# Patient Record
Sex: FEMALE | Race: WHITE | NOT HISPANIC OR LATINO | Employment: FULL TIME | URBAN - METROPOLITAN AREA
[De-identification: names, ages, dates, MRNs, and addresses within clinical notes are randomized per-mention and may not be internally consistent; named-entity substitution may affect disease eponyms.]

---

## 2017-05-30 ENCOUNTER — THERAPY VISIT (OUTPATIENT)
Dept: OCCUPATIONAL THERAPY | Facility: CLINIC | Age: 62
End: 2017-05-30
Payer: COMMERCIAL

## 2017-05-30 DIAGNOSIS — R29.898 HAND WEAKNESS: Primary | ICD-10-CM

## 2017-05-30 PROCEDURE — 97535 SELF CARE MNGMENT TRAINING: CPT | Mod: GO | Performed by: OCCUPATIONAL THERAPIST

## 2017-05-30 PROCEDURE — 97110 THERAPEUTIC EXERCISES: CPT | Mod: GO | Performed by: OCCUPATIONAL THERAPIST

## 2017-05-30 PROCEDURE — 97165 OT EVAL LOW COMPLEX 30 MIN: CPT | Mod: GO | Performed by: OCCUPATIONAL THERAPIST

## 2017-05-30 NOTE — MR AVS SNAPSHOT
After Visit Summary   5/30/2017    Kristin Brooke    MRN: [id number]           Patient Information     Date Of Birth          1955        Visit Information        Provider Department      5/30/2017 9:30 AM Gaby Smith [facility]        Today's Diagnoses     Hand weakness    -  1       Follow-ups after your visit        Who to contact     If you have questions or need follow up information about today's clinic visit or your schedule please contact [facility] directly at [phone].  Normal or non-critical lab and imaging results will be communicated to you by Dnevnikhart, letter or phone within 4 business days after the clinic has received the results. If you do not hear from us within 7 days, please contact the clinic through Dnevnikhart or phone. If you have a critical or abnormal lab result, we will notify you by phone as soon as possible.  Submit refill requests through [employer] or call your pharmacy and they will forward the refill request to us. Please allow 3 business days for your refill to be completed.          Additional Information About Your Visit        MyChart Information     [employer] gives you secure access to your electronic health record. If you see a primary care provider, you can also send messages to your care team and make appointments. If you have questions, please call your primary care clinic.  If you do not have a primary care provider, please call [phone] and they will assist you.        Care EveryWhere ID     This is your Care EveryWhere ID. This could be used by other organizations to access your [place] medical records  [id number]         Blood Pressure from Last 3 Encounters:   12/30/11 126/78   11/15/11 133/77   06/13/11 132/82    Weight from Last 3 Encounters:   12/30/11 54.4 kg (120 lb)   11/15/11 55.3 kg (122 lb)   06/13/11 55.9 kg (123 lb 3.2 oz)              We Performed the Following     HC OT EVAL, LOW COMPLEXITY     AUBREY INITIAL EVAL  REPORT     SELF CARE MNGMENT TRAINING     THERAPEUTIC EXERCISES        Primary Care Provider Office Phone # Fax #    Macario Stanford [phone]       [place] [address]  [facility] [address]        Thank you!     Thank you for choosing [facility]  for your care. Our goal is always to provide you with excellent care. Hearing back from our patients is one way we can continue to improve our services. Please take a few minutes to complete the written survey that you may receive in the mail after your visit with us. Thank you!             Your Updated Medication List - Protect others around you: Learn how to safely use, store and throw away your medicines at www.disposemymeds.org.          This list is accurate as of: 5/30/17 11:59 AM.  Always use your most recent med list.                   Brand Name Dispense Instructions for use    ADVIL 200 MG capsule   Generic drug:  ibuprofen      1 CAPSULE EVERY 4 TO 6 HOURS AS NEEDED       ALPRAZolam 0.25 MG tablet    XANAX    60 tablet    Take 1 tablet by mouth 2 times daily as needed for anxiety.       atorvastatin 10 MG tablet    LIPITOR    90 tablet    Take 1 tablet by mouth daily.       * COMPOUND - PHARMACY TO MIX COMPOUNDED MEDICATION    CMPD RX    6 suppository    1 suppository once a week. 1 % HCT Urethral Suppository       lisinopril 10 MG tablet    PRINIVIL/ZESTRIL    94 tablet    Take 1 tablet by mouth daily.       Multiple vitamin Tabs      1 TABLET DAILY       sertraline 50 MG tablet    ZOLOFT    45 tablet    Take 1.5 tablets by mouth daily.       * UNKNOWN MED DOSAGE      calcium carbonate chews 1200mg once daily       zolpidem 5 MG tablet    AMBIEN    30 tablet    Take 1 tablet by mouth nightly as needed for sleep. at bedtime.       * Notice:  This list has 2 medication(s) that are the same as other medications prescribed for you. Read the directions carefully, and ask your doctor or other care provider to review them with  you.

## 2017-05-30 NOTE — PROGRESS NOTES
Hand Therapy Initial Evaluation    Current Date:  5/30/2017    Diagnosis:  Wrist/hand weakness  DOI: April 2017      Subjective:  Kristin Brooke is a 61 year old right hand dominant female.    Patient reports symptoms of stiffness/loss of motion, weakness/loss of strength and tingling  of the right hand which occurred due to overuse. Since onset symptoms are Unchanged  Special tests:  none.  Previous treatment: Hand Therapy 5 years ago.    General health as reported by patient is excellent.  Pertinent medical history includes:high blood pressure  Medical allergies:Levequin.  Surgical history: none.  Medication history: high blood pressure.    Current occupation is    Currently working in normal job without restrictions  Job Tasks: prolonged standing, repetitive tasks  Barriers include:none  Prior functional level:  no limitations    Additional Occupational Profile Information (patterns of daily living, interests, values and needs): [employer], yoga, bike ride, cook, walk dog    Upper Extremity Functional Index Score:  SCORE:   Column Totals: /80: 73   (A lower score indicates greater disability.)      Objective:  Pain Level Report  VAS(0-10) 5/30/2017   At Rest: 0   With Use: 0     Functional Exam:   Pain Report:  - none    + mild    ++ moderate    +++ severe      Posture:  []     Normal   [x]    Forward Neck Posture  []    Rounded Forward Shoulders  []    Shoulder Height Difference  Comments:      Cervical Screen Active  5/30/17     ROM good     Flexion -     Extension -     Lateral Flexion Right -     Lateral Flexion Left -     Rotation Right -     Rotation Left -     Compression -     Traction        Special Tests:  Symptom Report:    5/30/2017   [place] +   Costoclavicular +   Elbow flexion +                             Palpation:  Pain Report:  - none    + mild    ++ moderate    +++ severe   Tenderness 5/30/2017   Ulnar elbow R>L   Ulnar forearm (several areas) +   [place] -   Pec  +    Subscap/teres/lat ++   tricep ++     Ulnar Nerve MMT: (Scale 0/5)   5/30/17      FCU 5/5      FDP II, IV 4/5      ADM 4/5      ODM 4/5      FDM 5/5      Palmar Interossei 4-/5      Dorsal Interossei 4/5      Lumbricals III, IV 5/5      Adductor Pollicis 4-/5      FPB 5/5            Strength:   (Measured in pounds)  Pain Report:  - none    + mild    ++ moderate    +++ severe     5/30/2017   Trials Left Right   1  2  3     Average: 55# 38#     Lat Pinch  5/30/2017   Trials Left Right   1  2  3     Average: 16# 10# + Fromert's sign     3 Pt Pinch  5/30/2017   Trials Left Right   1  2  3     Average: 14# 6#     Edema:  NONE    Sensation:  WNL throughout all nerve distributions; per patient report distribution    Assessment:  Patient presents with symptoms consistent with diagnosis of UE nerve impingment and hand weakness,  with conservative intervention.     Patient's limitations or Problem List includes:  Weakness, Decreased , Decreased pinch and Tightness in musculature of the right hand which interferes with the patient's ability to perform Self Care Tasks (dressing, eating, bathing, hygiene/toileting), Work Tasks, Recreational Activities, Household Chores and Driving  as compared to previous level of function.    Rehab Potential:  Good - Return to full activity, some limitations    Patient will benefit from skilled Occupational Therapy to increase flexibility, overall strength,  strength and pinch strength to return to previous activity level and resume normal daily tasks and to reach their rehab potential.    Barriers to Learning:  No barrier    Communication Issues:  Patient appears to be able to clearly communicate and understand verbal and written communication and follow directions correctly.    Assessment of Occupational Performance:  5 or more Performance Deficits  Identified Performance Deficits: bathing/showering, toileting, dressing, feeding, hygiene and grooming, health management and  maintenance, home establishment and management, meal preparation and cleanup, shopping, work and leisure activities      Clinical Decision Making (Complexity): Low complexity    Treatment Explanation:  The following has been discussed with the patient:  RX ordered/plan of care  Anticipated outcomes  Possible risks and side effects    Plan:  Frequency:  1 X week, once daily  Duration:  for 6-12 visits    Treatment Plan:    Modalities:  Laser Light  Therapeutic Exercise:  AROM and Stabilization  Neuromuscular re-education:  Nerve Gliding  Manual Techniques:  Myofascial release  Orthotic Fabrication:  prn  Discharge Plan:  Achieve all LTG.  Independent in home treatment program.  Reach maximal therapeutic benefit.    Home Exercise Program:  Foam roller pecs;lats;teres  Tennis ball to infraspinatus  Can massage to tricep  Scapular squeezes    Next Visit:  Nerve gliding  Cont. Foam rolling  strengthening

## 2017-06-17 ENCOUNTER — HEALTH MAINTENANCE LETTER (OUTPATIENT)
Age: 62
End: 2017-06-17

## 2017-06-23 PROBLEM — R29.898 HAND WEAKNESS: Status: RESOLVED | Noted: 2017-05-30 | Resolved: 2017-06-23

## 2017-06-23 NOTE — PROGRESS NOTES
Discharge Note -Hand Therapy    Initial Evaluation Date:  5/30/17  Current Date: 6/23/2017  Number of Visits: 1    S:    Pt did not follow up for scheduled visits.    O:  Objective information is not available as pt has not returned for therapy.  Last visit or last objective information will serve as final entry.      A: Pt did not return for further treatment.    Status of goals is unknown due to lack of followup of patient.      P:  Discharge from Hand Therapy.

## 2017-07-13 ENCOUNTER — THERAPY VISIT (OUTPATIENT)
Dept: OCCUPATIONAL THERAPY | Facility: CLINIC | Age: 62
End: 2017-07-13
Payer: COMMERCIAL

## 2017-07-13 DIAGNOSIS — G56.20 LESION OF ULNAR NERVE: Primary | ICD-10-CM

## 2017-07-13 DIAGNOSIS — M79.609 PAIN IN LIMB: ICD-10-CM

## 2017-07-13 DIAGNOSIS — R29.898 HAND WEAKNESS: ICD-10-CM

## 2017-07-13 DIAGNOSIS — M65.311 TRIGGER THUMB OF RIGHT HAND: ICD-10-CM

## 2017-07-13 PROCEDURE — 97110 THERAPEUTIC EXERCISES: CPT | Mod: GO | Performed by: OCCUPATIONAL THERAPIST

## 2017-07-13 PROCEDURE — 97140 MANUAL THERAPY 1/> REGIONS: CPT | Mod: GO | Performed by: OCCUPATIONAL THERAPIST

## 2017-07-13 NOTE — PROGRESS NOTES
Progress Note - Hand Therapy    Current Date:  7/13/2017    Diagnosis:  Wrist/hand weakness  DOI: April 2017    Number of visits to date:  1    Reporting period is 5/30/2017 to 7/13/2017.    Subjectve:   Subjective changes as noted by patient:  I haven't done my exercises.  I don't think I am doing them correctly.     Functional changes noted by patient:  No Change to Self Care Tasks, Work Tasks, Sleep Patterns, Recreational Activities, Household Chores and Driving   Patient has noted adverse reaction to:  None        Objective:    Pain Level Report  VAS(0-10) 5/30/2017 7/13/2017   At Rest: 0 0   With Use: 0 0     Functional Exam:   Pain Report:  - none    + mild    ++ moderate    +++ severe      Posture:  []     Normal   [x]    Forward Neck Posture  []    Rounded Forward Shoulders  []    Shoulder Height Difference  Comments:      Cervical Screen Active  5/30/17     ROM good     Flexion -     Extension -     Lateral Flexion Right -     Lateral Flexion Left -     Rotation Right -     Rotation Left -     Compression -     Traction        Special Tests:  Symptom Report:    5/30/2017 7/13/2017   [place] +    Costoclavicular +    Elbow flexion +                                    Palpation:  Pain Report:  - none    + mild    ++ moderate    +++ severe   Tenderness 5/30/2017 7/13/2017   Ulnar elbow R>L    Ulnar forearm (several areas) + +   [place] -    Pec  + +   Subscap/teres/lat ++ ++   tricep ++ ++     Ulnar Nerve MMT: (Scale 0/5)   5/30/17 7/13/17     FCU 5/5 5/5     FDP II, IV 4/5 4/5     ADM 4/5 4/5     ODM 4/5 4/5     FDM 5/5 5/5     Palmar Interossei 4-/5 4-/5     Dorsal Interossei 4/5 4/5     Lumbricals III, IV 5/5 5/5     Adductor Pollicis 4-/5 4-/5     FPB 5/5 5/5           Strength:   (Measured in pounds)  Pain Report:  - none    + mild    ++ moderate    +++ severe     5/30/2017 7/13/2017   Trials Left Right Right   1  2  3      Average: 55# 38# 38#     Lat Pinch  5/30/2017 7/13/2017   Trials Left Right  Right   1  2  3      Average: 16# 10# + Fromert's sign 10# + Fromert's sign     3 Pt Pinch  5/30/2017 7/13/2017   Trials Left Right Right   1  2  3      Average: 14# 6# 6#     Edema:  NONE    Sensation:  WNL throughout all nerve distributions; per patient report distribution    Assessment:  Response to therapy has been lack of progress in:  Strength:  Due to not doing exercises.    Overall Assessment:  No change of symptoms has been noted.  STG/LTG:  STGoals have been reviewed and no progress has been made;  see goal sheet for details and changes.    Plan:  Frequency/Duration:  Recommend continuing to see patient  1 X week, once daily  for 8-12 visits and then wean to   strengthening to 1x/month.  Appropriateness of Rx I have re-evaluated this patient and find that the nature, scope, duration and intensity of the therapy is appropriate for the medical condition of the patient.    Recommendations for Continued Therapy      Home Exercise Program:  Foam roller pecs;lats;teres  Tennis ball to infraspinatus  Can massage to tricep  Scapular squeezes    Next Visit:  Nerve gliding  Cont. Foam rolling  strengthening

## 2017-07-13 NOTE — MR AVS SNAPSHOT
After Visit Summary   7/13/2017    Kristin Brooke    MRN: [id number]           Patient Information     Date Of Birth          1955        Visit Information        Provider Department      7/13/2017 9:30 AM Gaby Smith [facility]        Today's Diagnoses     Lesion of ulnar nerve    -  1    Pain in limb        Hand weakness        Trigger thumb of right hand           Follow-ups after your visit        Your next 10 appointments already scheduled     Jul 18, 2017  9:30 AM CDT   AUBREY Hand with Gaby Smith   [facility] ([facility])    [address] [id number] [phone]            Jul 27, 2017  9:30 AM CDT   AUBREY Hand with Gaby Sarah   [facility] ([facility])    [address] [facility] [address]  [facility] [id number] [phone]              Who to contact     If you have questions or need follow up information about today's clinic visit or your schedule please contact [facility] directly at [phone].  Normal or non-critical lab and imaging results will be communicated to you by [employer] Centerhart, letter or phone within 4 business days after the clinic has received the results. If you do not hear from us within 7 days, please contact the clinic through [employer] or phone. If you have a critical or abnormal lab result, we will notify you by phone as soon as possible.  Submit refill requests through [employer] or call your pharmacy and they will forward the refill request to us. Please allow 3 business days for your refill to be completed.          Additional Information About Your Visit        National Transcript Centerhart Information     [employer] gives you secure access to your electronic health record. If you see a primary care provider, you can also send messages to your care team and make appointments. If you have questions, please call your primary care clinic.  If you do not have a primary care provider, please call [phone] and  they will assist you.        Care EveryWhere ID     This is your Care EveryWhere ID. This could be used by other organizations to access your [place] medical records  [id number]         Blood Pressure from Last 3 Encounters:   12/30/11 126/78   11/15/11 133/77   06/13/11 132/82    Weight from Last 3 Encounters:   12/30/11 54.4 kg (120 lb)   11/15/11 55.3 kg (122 lb)   06/13/11 55.9 kg (123 lb 3.2 oz)              We Performed the Following     AUBREY PROGRESS NOTES REPORT     MANUAL THER TECH,1+REGIONS,EA 15 MIN     THERAPEUTIC EXERCISES        Primary Care Provider Office Phone # Fax #    Macario Stanford [phone]       [facility] [address]  [facility] [address]        Equal Access to Services     VALE NUNES : Hadii aad ku hadasho Soomaali, waaxda luqadaha, qaybta kaalmada adeegyada, waxay yakov hayrosio jacques . So [facility] [phone].    ATENCIÓN: Si habla español, tiene a matthews disposición servicios gratuitos de asistencia lingüística. Sanjuana al [phone].    We comply with applicable federal civil rights laws and [place] laws. We do not discriminate on the basis of race, color, national origin, age, disability sex, sexual orientation or gender identity.            Thank you!     Thank you for choosing [facility]  for your care. Our goal is always to provide you with excellent care. Hearing back from our patients is one way we can continue to improve our services. Please take a few minutes to complete the written survey that you may receive in the mail after your visit with us. Thank you!             Your Updated Medication List - Protect others around you: Learn how to safely use, store and throw away your medicines at www.disposemymeds.org.          This list is accurate as of: 7/13/17 10:30 AM.  Always use your most recent med list.                   Brand Name Dispense Instructions for use Diagnosis    ADVIL 200 MG capsule   Generic drug:  ibuprofen       1 CAPSULE EVERY 4 TO 6 HOURS AS NEEDED        ALPRAZolam 0.25 MG tablet    XANAX    60 tablet    Take 1 tablet by mouth 2 times daily as needed for anxiety.    Generalised anxiety disorder       atorvastatin 10 MG tablet    LIPITOR    90 tablet    Take 1 tablet by mouth daily.    UTI (urinary tract infection)       * COMPOUND - PHARMACY TO MIX COMPOUNDED MEDICATION    CMPD RX    6 suppository    1 suppository once a week. 1 % HCT Urethral Suppository    UTI (urinary tract infection), Dysuria       lisinopril 10 MG tablet    PRINIVIL/ZESTRIL    94 tablet    Take 1 tablet by mouth daily.    Essential hypertension, benign       Multiple vitamin Tabs      1 TABLET DAILY        sertraline 50 MG tablet    ZOLOFT    45 tablet    Take 1.5 tablets by mouth daily.    Acute stress reaction       * UNKNOWN MED DOSAGE      calcium carbonate chews 1200mg once daily        zolpidem 5 MG tablet    AMBIEN    30 tablet    Take 1 tablet by mouth nightly as needed for sleep. at bedtime.    Insomnia       * Notice:  This list has 2 medication(s) that are the same as other medications prescribed for you. Read the directions carefully, and ask your doctor or other care provider to review them with you.

## 2017-07-18 ENCOUNTER — THERAPY VISIT (OUTPATIENT)
Dept: OCCUPATIONAL THERAPY | Facility: CLINIC | Age: 62
End: 2017-07-18
Payer: COMMERCIAL

## 2017-07-18 DIAGNOSIS — M79.609 PAIN IN LIMB: ICD-10-CM

## 2017-07-18 DIAGNOSIS — G56.20 LESION OF ULNAR NERVE: Primary | ICD-10-CM

## 2017-07-18 DIAGNOSIS — M65.311 TRIGGER THUMB OF RIGHT HAND: ICD-10-CM

## 2017-07-18 DIAGNOSIS — R29.898 HAND WEAKNESS: ICD-10-CM

## 2017-07-18 PROCEDURE — 97140 MANUAL THERAPY 1/> REGIONS: CPT | Mod: GO | Performed by: OCCUPATIONAL THERAPIST

## 2017-07-18 PROCEDURE — 97110 THERAPEUTIC EXERCISES: CPT | Mod: GO | Performed by: OCCUPATIONAL THERAPIST

## 2017-07-18 PROCEDURE — 97026 INFRARED THERAPY: CPT | Mod: GO | Performed by: OCCUPATIONAL THERAPIST

## 2017-07-18 NOTE — MR AVS SNAPSHOT
After Visit Summary   7/18/2017    Kristin Brooke    MRN: [id number]           Patient Information     Date Of Birth          1955        Visit Information        Provider Department      7/18/2017 9:30 AM Gaby Smith [facility]        Today's Diagnoses     Lesion of ulnar nerve    -  1    Pain in limb        Trigger thumb of right hand        Hand weakness           Follow-ups after your visit        Your next 10 appointments already scheduled     Jul 27, 2017  9:30 AM CDT   [facility] Hand with Gaby Smith   [facility]    [address] [place] [id number] [phone]              Who to contact     If you have questions or need follow up information about today's clinic visit or your schedule please contact [facility] directly at [phone].  Normal or non-critical lab and imaging results will be communicated to you by PureCarshart, letter or phone within 4 business days after the clinic has received the results. If you do not hear from us within 7 days, please contact the clinic through PureCarshart or phone. If you have a critical or abnormal lab result, we will notify you by phone as soon as possible.  Submit refill requests through [employer] or call your pharmacy and they will forward the refill request to us. Please allow 3 business days for your refill to be completed.          Additional Information About Your Visit        MyChart Information     [employer] gives you secure access to your electronic health record. If you see a primary care provider, you can also send messages to your care team and make appointments. If you have questions, please call your primary care clinic.  If you do not have a primary care provider, please call [phone] and they will assist you.        Care EveryWhere ID     This is your Care EveryWhere ID. This could be used by other organizations to access your [place] medical records  [id number]          Blood Pressure from Last 3 Encounters:   12/30/11 126/78   11/15/11 133/77   06/13/11 132/82    Weight from Last 3 Encounters:   12/30/11 54.4 kg (120 lb)   11/15/11 55.3 kg (122 lb)   06/13/11 55.9 kg (123 lb 3.2 oz)              We Performed the Following     INFRARED THERAPY     MANUAL THER TECH,1+REGIONS,EA 15 MIN     THERAPEUTIC EXERCISES        Primary Care Provider Office Phone # Fax #    Macario Stanford [phone]       [facility] [address] [facility] [address]        Equal Access to Services     [facility] : Hadii aad ku hadasho Soperryali, waaxda luqadaha, qaybta kaalmada adeegyada, waxtanya jacques . So [facility] [phone].    ATENCIÓN: Si habla español, tiene a matthews disposición servicios gratuitos de asistencia lingüística. [facility] [phone].    We comply with applicable federal civil rights laws and [place] laws. We do not discriminate on the basis of race, color, national origin, age, disability sex, sexual orientation or gender identity.            Thank you!     Thank you for choosing [facility]  for your care. Our goal is always to provide you with excellent care. Hearing back from our patients is one way we can continue to improve our services. Please take a few minutes to complete the written survey that you may receive in the mail after your visit with us. Thank you!             Your Updated Medication List - Protect others around you: Learn how to safely use, store and throw away your medicines at www.disposemymeds.org.          This list is accurate as of: 7/18/17 11:41 AM.  Always use your most recent med list.                   Brand Name Dispense Instructions for use Diagnosis    ADVIL 200 MG capsule   Generic drug:  ibuprofen      1 CAPSULE EVERY 4 TO 6 HOURS AS NEEDED        ALPRAZolam 0.25 MG tablet    XANAX    60 tablet    Take 1 tablet by mouth 2 times daily as needed for anxiety.    Generalised anxiety disorder        atorvastatin 10 MG tablet    LIPITOR    90 tablet    Take 1 tablet by mouth daily.    UTI (urinary tract infection)       * COMPOUND - PHARMACY TO MIX COMPOUNDED MEDICATION    CMPD RX    6 suppository    1 suppository once a week. 1 % HCT Urethral Suppository    UTI (urinary tract infection), Dysuria       lisinopril 10 MG tablet    PRINIVIL/ZESTRIL    94 tablet    Take 1 tablet by mouth daily.    Essential hypertension, benign       Multiple vitamin Tabs      1 TABLET DAILY        sertraline 50 MG tablet    ZOLOFT    45 tablet    Take 1.5 tablets by mouth daily.    Acute stress reaction       * UNKNOWN MED DOSAGE      calcium carbonate chews 1200mg once daily        zolpidem 5 MG tablet    AMBIEN    30 tablet    Take 1 tablet by mouth nightly as needed for sleep. at bedtime.    Insomnia       * Notice:  This list has 2 medication(s) that are the same as other medications prescribed for you. Read the directions carefully, and ask your doctor or other care provider to review them with you.

## 2017-07-18 NOTE — PROGRESS NOTES
SOAP note objective information for 7/18/2017.    Please refer to the daily flowsheet for treatment today, total treatment time and time spent performing 1:1 timed codes.       Pain Level Report  VAS(0-10) 5/30/2017 7/13/2017 7/18/2017   At Rest: 0 0 0   With Use: 0 0 0     Functional Exam:   Pain Report:  - none    + mild    ++ moderate    +++ severe      Posture:  []     Normal   [x]    Forward Neck Posture  []    Rounded Forward Shoulders  []    Shoulder Height Difference  Comments:      Cervical Screen Active  5/30/17     ROM good     Flexion -     Extension -     Lateral Flexion Right -     Lateral Flexion Left -     Rotation Right -     Rotation Left -     Compression -     Traction        Special Tests:  Symptom Report:    5/30/2017    [place] +    Costoclavicular +    Elbow flexion +                                    Palpation:  Pain Report:  - none    + mild    ++ moderate    +++ severe   Tenderness 5/30/2017 7/13/2017   Ulnar elbow R>L    Ulnar forearm (several areas) + +   [place] -    Pec  + +   Subscap/teres/lat ++ ++   tricep ++ ++     Ulnar Nerve MMT: (Scale 0/5)   5/30/17 7/13/17     FCU 5/5 5/5     FDP II, IV 4/5 4/5     ADM 4/5 4/5     ODM 4/5 4/5     FDM 5/5 5/5     Palmar Interossei 4-/5 4-/5     Dorsal Interossei 4/5 4/5     Lumbricals III, IV 5/5 5/5     Adductor Pollicis 4-/5 4-/5     FPB 5/5 5/5           Strength:   (Measured in pounds)  Pain Report:  - none    + mild    ++ moderate    +++ severe     5/30/2017 7/13/2017   Trials Left Right Right   1  2  3      Average: 55# 38# 38#     Lat Pinch  5/30/2017 7/13/2017   Trials Left Right Right   1  2  3      Average: 16# 10# + Fromert's sign 10# + Fromert's sign     3 Pt Pinch  5/30/2017 7/13/2017   Trials Left Right Right   1  2  3      Average: 14# 6# 6#     Edema:  NONE    Sensation:  WNL throughout all nerve distributions; per patient report distribution    Home Exercise Program:  Foam roller pecs;lats;teres  Tennis ball to  infraspinatus  Can massage to tricep  Scapular squeezes  Lat stretch  pec stretch for median and ulnar    Next Visit:  Nerve gliding  Cont. Foam rolling  strengthening

## 2017-07-27 ENCOUNTER — THERAPY VISIT (OUTPATIENT)
Dept: OCCUPATIONAL THERAPY | Facility: CLINIC | Age: 62
End: 2017-07-27
Payer: COMMERCIAL

## 2017-07-27 DIAGNOSIS — R29.898 HAND WEAKNESS: Primary | ICD-10-CM

## 2017-07-27 PROCEDURE — 97110 THERAPEUTIC EXERCISES: CPT | Mod: GO | Performed by: OCCUPATIONAL THERAPIST

## 2017-07-27 PROCEDURE — 97140 MANUAL THERAPY 1/> REGIONS: CPT | Mod: GO | Performed by: OCCUPATIONAL THERAPIST

## 2017-07-27 PROCEDURE — 97026 INFRARED THERAPY: CPT | Mod: GO | Performed by: OCCUPATIONAL THERAPIST

## 2017-07-27 NOTE — PROGRESS NOTES
SOAP note objective information for 7/27/2017    Please refer to the daily flowsheet for treatment today, total treatment time and time spent performing 1:1 timed codes.       Pain Level Report  VAS(0-10) 5/30/2017 7/13/2017 7/18/2017   At Rest: 0 0 0   With Use: 0 0 0     Functional Exam:   Pain Report:  - none    + mild    ++ moderate    +++ severe      Posture:  []     Normal   [x]    Forward Neck Posture  []    Rounded Forward Shoulders  []    Shoulder Height Difference  Comments:      Cervical Screen Active  5/30/17     ROM good     Flexion -     Extension -     Lateral Flexion Right -     Lateral Flexion Left -     Rotation Right -     Rotation Left -     Compression -     Traction        Special Tests:  Symptom Report:    5/30/2017    [place] +    Costoclavicular +    Elbow flexion +                                    Palpation:  Pain Report:  - none    + mild    ++ moderate    +++ severe   Tenderness 5/30/17 7/13/17 7/27/17   Ulnar elbow R>L     Ulnar forearm (several areas) + +    [place] -     Pec  + +    Subscap/teres/lat ++ ++    tricep ++ ++      Ulnar Nerve MMT: (Scale 0/5)   5/30/17 7/13/17     FCU 5/5 5/5     FDP II, IV 4/5 4/5     ADM 4/5 4/5     ODM 4/5 4/5     FDM 5/5 5/5     Palmar Interossei 4-/5 4-/5     Dorsal Interossei 4/5 4/5     Lumbricals III, IV 5/5 5/5     Adductor Pollicis 4-/5 4-/5     FPB 5/5 5/5           Strength:   (Measured in pounds)  Pain Report:  - none    + mild    ++ moderate    +++ severe     5/30/2017 7/13/2017   Trials Left Right Right   1  2  3      Average: 55# 38# 38#     Lat Pinch  5/30/2017 7/13/2017   Trials Left Right Right   1  2  3      Average: 16# 10# + Fromert's sign 10# + Fromert's sign     3 Pt Pinch  5/30/2017 7/13/2017   Trials Left Right Right   1  2  3      Average: 14# 6# 6#     Edema:  NONE    Sensation:  WNL throughout all nerve distributions; per patient report distribution    Home Exercise Program:  Foam roller pecs;lats;teres  Tennis ball to  infraspinatus  Can massage to tricep  Scapular squeezes  Lat stretch  pec stretch for median and ulnar    Next Visit:  Nerve gliding  Cont. Foam rolling  strengthening

## 2017-07-27 NOTE — MR AVS SNAPSHOT
After Visit Summary   7/27/2017    Kristin Brooke    MRN: [id number]           Patient Information     Date Of Birth          1955        Visit Information        Provider Department      7/27/2017 9:30 AM Gaby Smith [facility]        Today's Diagnoses     Hand weakness    -  1       Follow-ups after your visit        Who to contact     If you have questions or need follow up information about today's clinic visit or your schedule please contact [facility] directly at [phone].  Normal or non-critical lab and imaging results will be communicated to you by Cumuluxhart, letter or phone within 4 business days after the clinic has received the results. If you do not hear from us within 7 days, please contact the clinic through Cumuluxhart or phone. If you have a critical or abnormal lab result, we will notify you by phone as soon as possible.  Submit refill requests through CoinJar or call your pharmacy and they will forward the refill request to us. Please allow 3 business days for your refill to be completed.          Additional Information About Your Visit        MyChart Information     CoinJar gives you secure access to your electronic health record. If you see a primary care provider, you can also send messages to your care team and make appointments. If you have questions, please call your primary care clinic.  If you do not have a primary care provider, please call [phone] and they will assist you.        Care EveryWhere ID     This is your Care EveryWhere ID. This could be used by other organizations to access your [place] medical records  [id number]         Blood Pressure from Last 3 Encounters:   12/30/11 126/78   11/15/11 133/77   06/13/11 132/82    Weight from Last 3 Encounters:   12/30/11 54.4 kg (120 lb)   11/15/11 55.3 kg (122 lb)   06/13/11 55.9 kg (123 lb 3.2 oz)              We Performed the Following     INFRARED THERAPY     MANUAL THER TECH,1+REGIONS,EA 15  MIN     THERAPEUTIC EXERCISES        Primary Care Provider Office Phone # Fax #    Macario Stanford [phone]       [place] [address]  [facility] [address]        Equal Access to Services     VLAE NUNES : Haduche yousif ku opalo Soperryali, waaxda luqadaha, qaybta kaalmada adeegyada, waxtanya vinesn iveth lee laSergiorosio frausto. So [facility] [phone].    ATENCIÓN: Si habla español, tiene a matthews disposición servicios gratuitos de asistencia lingüística. Llame al [phone].    We comply with applicable federal civil rights laws and [place] laws. We do not discriminate on the basis of race, color, national origin, age, disability sex, sexual orientation or gender identity.            Thank you!     Thank you for choosing [facility]  for your care. Our goal is always to provide you with excellent care. Hearing back from our patients is one way we can continue to improve our services. Please take a few minutes to complete the written survey that you may receive in the mail after your visit with us. Thank you!             Your Updated Medication List - Protect others around you: Learn how to safely use, store and throw away your medicines at www.disposemymeds.org.          This list is accurate as of: 7/27/17 11:28 AM.  Always use your most recent med list.                   Brand Name Dispense Instructions for use Diagnosis    ADVIL 200 MG capsule   Generic drug:  ibuprofen      1 CAPSULE EVERY 4 TO 6 HOURS AS NEEDED        ALPRAZolam 0.25 MG tablet    XANAX    60 tablet    Take 1 tablet by mouth 2 times daily as needed for anxiety.    Generalised anxiety disorder       atorvastatin 10 MG tablet    LIPITOR    90 tablet    Take 1 tablet by mouth daily.    UTI (urinary tract infection)       * COMPOUNDED NON-CONTROLLED SUBSTANCE - PHARMACY TO MIX COMPOUNDED MEDICATION    CMPD RX    6 suppository    1 suppository once a week. 1 % HCT Urethral Suppository    UTI (urinary tract  infection), Dysuria       lisinopril 10 MG tablet    PRINIVIL/ZESTRIL    94 tablet    Take 1 tablet by mouth daily.    Essential hypertension, benign       Multiple vitamin Tabs      1 TABLET DAILY        sertraline 50 MG tablet    ZOLOFT    45 tablet    Take 1.5 tablets by mouth daily.    Acute stress reaction       * UNKNOWN MED DOSAGE      calcium carbonate chews 1200mg once daily        zolpidem 5 MG tablet    AMBIEN    30 tablet    Take 1 tablet by mouth nightly as needed for sleep. at bedtime.    Insomnia       * Notice:  This list has 2 medication(s) that are the same as other medications prescribed for you. Read the directions carefully, and ask your doctor or other care provider to review them with you.

## 2017-08-04 ENCOUNTER — THERAPY VISIT (OUTPATIENT)
Dept: OCCUPATIONAL THERAPY | Facility: CLINIC | Age: 62
End: 2017-08-04
Payer: COMMERCIAL

## 2017-08-04 DIAGNOSIS — R29.898 HAND WEAKNESS: ICD-10-CM

## 2017-08-04 PROCEDURE — 97112 NEUROMUSCULAR REEDUCATION: CPT | Mod: GO | Performed by: OCCUPATIONAL THERAPIST

## 2017-08-04 PROCEDURE — 97140 MANUAL THERAPY 1/> REGIONS: CPT | Mod: GO | Performed by: OCCUPATIONAL THERAPIST

## 2017-08-04 NOTE — MR AVS SNAPSHOT
After Visit Summary   8/4/2017    Kristin Brooke    MRN: [id number]           Patient Information     Date Of Birth          1955        Visit Information        Provider Department      8/4/2017 9:00 AM Gaby Smith [facility]        Today's Diagnoses     Hand weakness           Follow-ups after your visit        Your next 10 appointments already scheduled     Sep 01, 2017  9:30 AM CDT   AUBREY Hand with Gaby Sarah   [facility] ([facility])    [address] [place] [address] [phone]              Who to contact     If you have questions or need follow up information about today's clinic visit or your schedule please contact [facility] directly at [phone].  Normal or non-critical lab and imaging results will be communicated to you by Riot Gameshart, letter or phone within 4 business days after the clinic has received the results. If you do not hear from us within 7 days, please contact the clinic through Riot Gameshart or phone. If you have a critical or abnormal lab result, we will notify you by phone as soon as possible.  Submit refill requests through Realius or call your pharmacy and they will forward the refill request to us. Please allow 3 business days for your refill to be completed.          Additional Information About Your Visit        MyChart Information     Realius gives you secure access to your electronic health record. If you see a primary care provider, you can also send messages to your care team and make appointments. If you have questions, please call your primary care clinic.  If you do not have a primary care provider, please call [phone] and they will assist you.        Care EveryWhere ID     This is your Care EveryWhere ID. This could be used by other organizations to access your [place] medical records  [id number]         Blood Pressure from Last 3 Encounters:   12/30/11 126/78   11/15/11 133/77   06/13/11  132/82    Weight from Last 3 Encounters:   12/30/11 54.4 kg (120 lb)   11/15/11 55.3 kg (122 lb)   06/13/11 55.9 kg (123 lb 3.2 oz)              We Performed the Following     MANUAL THER TECH,1+REGIONS,EA 15 MIN     NEUROMUSCULAR RE-EDUCATION        Primary Care Provider Office Phone # Fax #    Macario Stanford [phone]       [address] [address]        Equal Access to Services     [facility]: Hadii aad ku hadasho Soomaali, waaxda luqadaha, qaybta kaalmada adeegyada, waxay idiin hayaan adeeg kharalana jacques . So [facility] [phone].    ATENCIÓN: Si habla español, tiene a matthews disposición servicios gratuitos de asistencia lingüística. [facility] [phone].    We comply with applicable federal civil rights laws and [place] laws. We do not discriminate on the basis of race, color, national origin, age, disability sex, sexual orientation or gender identity.            Thank you!     Thank you for choosing [facility]  for your care. Our goal is always to provide you with excellent care. Hearing back from our patients is one way we can continue to improve our services. Please take a few minutes to complete the written survey that you may receive in the mail after your visit with us. Thank you!             Your Updated Medication List - Protect others around you: Learn how to safely use, store and throw away your medicines at www.disposemymeds.org.          This list is accurate as of: 8/4/17  2:38 PM.  Always use your most recent med list.                   Brand Name Dispense Instructions for use Diagnosis    ADVIL 200 MG capsule   Generic drug:  ibuprofen      1 CAPSULE EVERY 4 TO 6 HOURS AS NEEDED        ALPRAZolam 0.25 MG tablet    XANAX    60 tablet    Take 1 tablet by mouth 2 times daily as needed for anxiety.    Generalised anxiety disorder       atorvastatin 10 MG tablet    LIPITOR    90 tablet    Take 1 tablet by mouth daily.    UTI (urinary tract  infection)       * COMPOUNDED NON-CONTROLLED SUBSTANCE - PHARMACY TO MIX COMPOUNDED MEDICATION    CMPD RX    6 suppository    1 suppository once a week. 1 % HCT Urethral Suppository    UTI (urinary tract infection), Dysuria       lisinopril 10 MG tablet    PRINIVIL/ZESTRIL    94 tablet    Take 1 tablet by mouth daily.    Essential hypertension, benign       Multiple vitamin Tabs      1 TABLET DAILY        sertraline 50 MG tablet    ZOLOFT    45 tablet    Take 1.5 tablets by mouth daily.    Acute stress reaction       * UNKNOWN MED DOSAGE      calcium carbonate chews 1200mg once daily        zolpidem 5 MG tablet    AMBIEN    30 tablet    Take 1 tablet by mouth nightly as needed for sleep. at bedtime.    Insomnia       * Notice:  This list has 2 medication(s) that are the same as other medications prescribed for you. Read the directions carefully, and ask your doctor or other care provider to review them with you.

## 2017-08-04 NOTE — PROGRESS NOTES
SOAP note objective information for 8/4/2017    Please refer to the daily flowsheet for treatment today, total treatment time and time spent performing 1:1 timed codes.       Pain Level Report  VAS(0-10) 5/30/2017 7/13/2017 7/18/2017   At Rest: 0 0 0   With Use: 0 0 0     Functional Exam:   Pain Report:  - none    + mild    ++ moderate    +++ severe      Posture:  []     Normal   [x]    Forward Neck Posture  []    Rounded Forward Shoulders  []    Shoulder Height Difference  Comments:      Cervical Screen Active  5/30/17     ROM good     Flexion -     Extension -     Lateral Flexion Right -     Lateral Flexion Left -     Rotation Right -     Rotation Left -     Compression -     Traction        Special Tests:  Symptom Report:    5/30/2017    [place] +    Costoclavicular +    Elbow flexion +                                    Palpation:  Pain Report:  - none    + mild    ++ moderate    +++ severe   Tenderness 5/30/17 7/13/17 8/4/2017   Ulnar elbow R>L     Ulnar forearm (several areas) + +    [place] -     Pec  + + +   Subscap/teres/lat ++ ++ +   tricep ++ ++ +     Ulnar Nerve MMT: (Scale 0/5)   5/30/17 7/13/17     FCU 5/5 5/5     FDP II, IV 4/5 4/5     ADM 4/5 4/5     ODM 4/5 4/5     FDM 5/5 5/5     Palmar Interossei 4-/5 4-/5     Dorsal Interossei 4/5 4/5     Lumbricals III, IV 5/5 5/5     Adductor Pollicis 4-/5 4-/5     FPB 5/5 5/5           Strength:   (Measured in pounds)  Pain Report:  - none    + mild    ++ moderate    +++ severe     5/30/2017 7/13/2017   Trials Left Right Right   1  2  3      Average: 55# 38# 38#     Lat Pinch  5/30/2017 7/13/2017   Trials Left Right Right   1  2  3      Average: 16# 10# + Fromert's sign 10# + Fromert's sign     3 Pt Pinch  5/30/2017 7/13/2017   Trials Left Right Right   1  2  3      Average: 14# 6# 6#     Edema:  NONE    Sensation:  WNL throughout all nerve distributions; per patient report distribution    Home Exercise Program:  Foam roller pecs;lats;teres  Tennis ball  to infraspinatus  Can massage to tricep  Scapular squeezes  Lat stretch  pec stretch for median and ulnar  Tennis ball/golf ball on wall in pecs  Pec minor wall stretch  Advanced ulnar nerve glide    Next Visit:  Nerve gliding  Cont. Foam rolling  strengthening

## 2017-08-31 DIAGNOSIS — N34.2 URETHRITIS: Primary | ICD-10-CM

## 2017-09-05 ENCOUNTER — OFFICE VISIT (OUTPATIENT)
Dept: UROLOGY | Facility: CLINIC | Age: 62
End: 2017-09-05
Payer: COMMERCIAL

## 2017-09-05 VITALS
DIASTOLIC BLOOD PRESSURE: 84 MMHG | HEIGHT: 64 IN | HEART RATE: 64 BPM | SYSTOLIC BLOOD PRESSURE: 126 MMHG | BODY MASS INDEX: 20.14 KG/M2 | WEIGHT: 118 LBS

## 2017-09-05 DIAGNOSIS — N34.2 URETHRITIS: ICD-10-CM

## 2017-09-05 LAB
ALBUMIN UR-MCNC: NEGATIVE MG/DL
APPEARANCE UR: CLEAR
BILIRUB UR QL STRIP: NEGATIVE
COLOR UR AUTO: YELLOW
GLUCOSE UR STRIP-MCNC: NEGATIVE MG/DL
HGB UR QL STRIP: NEGATIVE
KETONES UR STRIP-MCNC: NEGATIVE MG/DL
LEUKOCYTE ESTERASE UR QL STRIP: NEGATIVE
NITRATE UR QL: NEGATIVE
PH UR STRIP: 5.5 PH (ref 5–7)
RESIDUAL VOLUME (RV) (EXTERNAL): 0
SOURCE: NORMAL
SP GR UR STRIP: 1.02 (ref 1–1.03)
UROBILINOGEN UR STRIP-ACNC: 0.2 EU/DL (ref 0.2–1)

## 2017-09-05 PROCEDURE — 99212 OFFICE O/P EST SF 10 MIN: CPT | Mod: 25 | Performed by: PHYSICIAN ASSISTANT

## 2017-09-05 PROCEDURE — 81003 URINALYSIS AUTO W/O SCOPE: CPT | Performed by: PHYSICIAN ASSISTANT

## 2017-09-05 PROCEDURE — 51798 US URINE CAPACITY MEASURE: CPT | Performed by: PHYSICIAN ASSISTANT

## 2017-09-05 RX ORDER — ESTRADIOL 0.1 MG/G
2 CREAM VAGINAL
COMMUNITY

## 2017-09-05 ASSESSMENT — PAIN SCALES - GENERAL: PAINLEVEL: NO PAIN (0)

## 2017-09-05 NOTE — MR AVS SNAPSHOT
After Visit Summary   9/5/2017    Kristin Brooke    MRN: [id number]           Patient Information     Date Of Birth          1955        Visit Information        Provider Department      9/5/2017 11:30 AM Anneliese Owen PA-C [facility] [place]        Today's Diagnoses     Urethritis           Follow-ups after your visit        Your next 10 appointments already scheduled     Sep 08, 2017  8:30 AM CDT   AUBREY Hand with Gaby Smith   [facility]    [address] [place] [address] [phone]              Who to contact     If you have questions or need follow up information about today's clinic visit or your schedule please contact [facility] [place] directly at [phone].  Normal or non-critical lab and imaging results will be communicated to you by Vox Mediahart, letter or phone within 4 business days after the clinic has received the results. If you do not hear from us within 7 days, please contact the clinic through Vox Mediahart or phone. If you have a critical or abnormal lab result, we will notify you by phone as soon as possible.  Submit refill requests through Propeller Health or call your pharmacy and they will forward the refill request to us. Please allow 3 business days for your refill to be completed.          Additional Information About Your Visit        MyChart Information     [employer] Health gives you secure access to your electronic health record. If you see a primary care provider, you can also send messages to your care team and make appointments. If you have questions, please call your primary care clinic.  If you do not have a primary care provider, please call [phone] and they will assist you.        Care EveryWhere ID     This is your Care EveryWhere ID. This could be used by other organizations to access your [place] medical records  [id number]        Your Vitals Were  "    Pulse Height BMI (Body Mass Index)             64 1.626 m (5' 4\") 20.25 kg/m2          Blood Pressure from Last 3 Encounters:   09/05/17 126/84   12/30/11 126/78   11/15/11 133/77    Weight from Last 3 Encounters:   09/05/17 53.5 kg (118 lb)   12/30/11 54.4 kg (120 lb)   11/15/11 55.3 kg (122 lb)              We Performed the Following     MEASURE POST-VOID RESIDUAL URINE/BLADDER CAPACITY, US NON-IMAGING (00886)     UA without Microscopic [[id number]]          Today's Medication Changes          These changes are accurate as of: 9/5/17 12:10 PM.  If you have any questions, ask your nurse or doctor.               Start taking these medicines.        Dose/Directions    hydrocortisone 1% cream   Used for:  Urethritis   Started by:  Anneliese Owen PA-C        Apply topically once a week As needed for urethritis, give pt applicators   Quantity:  30 g   Refills:  3            Where to get your medicines      These medications were sent to [facility] [place], [place] - [address]  [address], [place] [address]     Phone:  [phone]     hydrocortisone 1% cream                Primary Care Provider Office Phone # Fax #    Merlin Emery Brown, [phone]       [facility] [address]  [facility] [address]        Equal Access to Services     [facility]: Hadii aad ku hadasho Soperryali, waaxda luqadaha, qaybta kaalmada adeegyada, bharath jacques . So [facility] [phone].    ATENCIÓN: Si habla español, tiene a matthews disposición servicios gratuitos de asistencia lingüística. Llame al [phone].    We comply with applicable federal civil rights laws and [place] laws. We do not discriminate on the basis of race, color, national origin, age, disability sex, sexual orientation or gender identity.            Thank you!     Thank you for choosing [facility] [place]  for your care. Our goal is always to " provide you with excellent care. Hearing back from our patients is one way we can continue to improve our services. Please take a few minutes to complete the written survey that you may receive in the mail after your visit with us. Thank you!             Your Updated Medication List - Protect others around you: Learn how to safely use, store and throw away your medicines at www.disposemymeds.org.          This list is accurate as of: 9/5/17 12:10 PM.  Always use your most recent med list.                   Brand Name Dispense Instructions for use Diagnosis    ADVIL 200 MG capsule   Generic drug:  ibuprofen      1 CAPSULE EVERY 4 TO 6 HOURS AS NEEDED        estradiol 0.1 MG/GM cream    ESTRACE     Place 2 g vaginally        hydrocortisone 1% cream     30 g    Apply topically once a week As needed for urethritis, give pt applicators    Urethritis       lisinopril 10 MG tablet    PRINIVIL/ZESTRIL    94 tablet    Take 1 tablet by mouth daily.    Essential hypertension, benign       Multiple vitamin Tabs      1 TABLET DAILY        TYLENOL PO      Take by mouth every 4 hours as needed for mild pain or fever

## 2017-09-05 NOTE — LETTER
9/5/2017     RE: Kristin Brooke  [address]   [facility] [address]     Dear Colleague,    Thank you for referring your patient, Kristin Brooke, to the [facility] at [facility]. Please see a copy of my visit note below.    September 5, 2017      CC:  Needing rx refill    HPI:  Kristin Brooke is a pleasant 62 year old female who presents with a long history of chronic urethritis. She is a patient of Dr. Goodman and has had stable symptoms for two years now with intermittent use of hydrocortisone intra-urethral cream.     No past medical history on file.   Urrethritis    No past surgical history on file.    Social History     Social History     Marital status:      Spouse name: N/A     Number of children: N/A     Years of education: N/A     Occupational History     Not on file.     Social History Main Topics     Smoking status: Never Smoker     Smokeless tobacco: Not on file     Alcohol use Yes      Comment: wine 5 days per week     Drug use: No     Sexual activity: Yes     Partners: Male     Other Topics Concern     Not on file     Social History Narrative     No narrative on file       Family History   Problem Relation Age of Onset     Hypertension Mother      Lipids Mother      Arthritis Mother      Thyroid Disease Mother      low     Eye Disorder Mother      cataract     Hypertension Father      Arthritis Father      Eye Disorder Father      cataract     Hypertension Brother      Lipids Brother      Thyroid Disease Brother        ROS:14 point ROS neg other than the symptoms noted above in the HPI.    Allergies   Allergen Reactions     Levaquin      Joint pain and locking       Current Outpatient Prescriptions   Medication     lisinopril (PRINIVIL,ZESTRIL) 10 MG tablet     ADVIL 200 MG OR CAPS     MULTIPLE VITAMIN OR TABS     No current facility-administered medications for this visit.          PEx:   /84 (BP  "Location: Right arm, Patient Position: Chair, Cuff Size: Adult Regular)  Pulse 64  Ht 1.626 m (5' 4\")  Wt 53.5 kg (118 lb)  BMI 20.25 kg/m2  Gen appearance:  Well groomed,: age-appropriate appearing female in NAD.   HEENT:  EOMI, AT NC, CN grossly normal  Psych:  alert , comfortable in no acute distress  Neuro:  A/O X 3  Skin:  Warm to touch, clear of rashes, ecchymoses  Resp:  No increased respiratory effort  lymph:  No LE edema  Abd:  Soft/NT, ND, no palpable masses, no CVAT  Back: bony spine is non-tender, flanks are nontender    Urine:  PVR: 0cc    A/P: Kristin Brooke is a 62 year old female with chronic urethritis.   1% hydrocortisone, intra-urethral weekly prn urethritis.   F/u 1 year or sooner if concerns.   Hydrate and avoid bladder irritants.     Anneliese Owen PA-C  [facility]    10 minutes were spent with the patient today, > 50% in counseling and coordination of care    "

## 2017-09-05 NOTE — PROGRESS NOTES
"September 5, 2017      CC:  Needing rx refill    HPI:  Kristin Brooke is a pleasant 62 year old female who presents with a long history of chronic urethritis. She is a patient of Dr. Goodman and has had stable symptoms for two years now with intermittent use of hydrocortisone intra-urethral cream.     No past medical history on file.   Urrethritis    No past surgical history on file.    Social History     Social History     Marital status:      Spouse name: N/A     Number of children: N/A     Years of education: N/A     Occupational History     Not on file.     Social History Main Topics     Smoking status: Never Smoker     Smokeless tobacco: Not on file     Alcohol use Yes      Comment: wine 5 days per week     Drug use: No     Sexual activity: Yes     Partners: Male     Other Topics Concern     Not on file     Social History Narrative     No narrative on file       Family History   Problem Relation Age of Onset     Hypertension Mother      Lipids Mother      Arthritis Mother      Thyroid Disease Mother      low     Eye Disorder Mother      cataract     Hypertension Father      Arthritis Father      Eye Disorder Father      cataract     Hypertension Brother      Lipids Brother      Thyroid Disease Brother        ROS:14 point ROS neg other than the symptoms noted above in the HPI.    Allergies   Allergen Reactions     Levaquin      Joint pain and locking       Current Outpatient Prescriptions   Medication     lisinopril (PRINIVIL,ZESTRIL) 10 MG tablet     ADVIL 200 MG OR CAPS     MULTIPLE VITAMIN OR TABS     No current facility-administered medications for this visit.          PEx:   /84 (BP Location: Right arm, Patient Position: Chair, Cuff Size: Adult Regular)  Pulse 64  Ht 1.626 m (5' 4\")  Wt 53.5 kg (118 lb)  BMI 20.25 kg/m2  Gen appearance:  Well groomed,: age-appropriate appearing female in NAD.   HEENT:  EOMI, AT NC, CN grossly normal  Psych:  alert , comfortable in no acute " distress  Neuro:  A/O X 3  Skin:  Warm to touch, clear of rashes, ecchymoses  Resp:  No increased respiratory effort  lymph:  No LE edema  Abd:  Soft/NT, ND, no palpable masses, no CVAT  Back: bony spine is non-tender, flanks are nontender    Urine:  PVR: 0cc    A/P: Kristin Brooke is a 62 year old female with chronic urethritis.   1% hydrocortisone, intra-urethral weekly prn urethritis.   F/u 1 year or sooner if concerns.   Hydrate and avoid bladder irritants.     Anneliese Owen PA-C  [facility]    10 minutes were spent with the patient today, > 50% in counseling and coordination of care

## 2017-09-08 ENCOUNTER — THERAPY VISIT (OUTPATIENT)
Dept: OCCUPATIONAL THERAPY | Facility: CLINIC | Age: 62
End: 2017-09-08
Payer: COMMERCIAL

## 2017-09-08 DIAGNOSIS — R29.898 HAND WEAKNESS: ICD-10-CM

## 2017-09-08 PROCEDURE — 97112 NEUROMUSCULAR REEDUCATION: CPT | Mod: GO | Performed by: OCCUPATIONAL THERAPIST

## 2017-09-08 PROCEDURE — 97026 INFRARED THERAPY: CPT | Mod: GO | Performed by: OCCUPATIONAL THERAPIST

## 2017-09-08 PROCEDURE — 97140 MANUAL THERAPY 1/> REGIONS: CPT | Mod: GO | Performed by: OCCUPATIONAL THERAPIST

## 2017-09-08 NOTE — PROGRESS NOTES
Progress Note - Hand Therapy    Current Date:  9/8/2017    Diagnosis:  Wrist/hand weakness  DOI: April 2017     Number of visits to date:  5    Reporting period is 7/13/2017 to 9/8/2017.    Subjectve:   Subjective changes as noted by patient: I feel like I am stronger.  I can use my scissors better and blowdrying is better.  Functional changes noted by patient:  Improvement in Self Care Tasks (dressing, eating, bathing, hygiene/toileting), Work Tasks, Recreational Activities and Household Chores  Patient has noted adverse reaction to:  None        Objective:  Pain Level Report  VAS(0-10) 5/30/2017 7/13/2017 7/18/2017 9/8/2017   At Rest: 0 0 0 0   With Use: 0 0 0 0     Functional Exam:   Pain Report:  - none    + mild    ++ moderate    +++ severe      Posture:  []     Normal   [x]    Forward Neck Posture  []    Rounded Forward Shoulders  []    Shoulder Height Difference  Comments:      Cervical Screen Active  5/30/17     ROM good     Flexion -     Extension -     Lateral Flexion Right -     Lateral Flexion Left -     Rotation Right -     Rotation Left -     Compression -     Traction        Special Tests:  Symptom Report:    5/30/2017 9/8/2017     [place] + B: + I, Thumb   Costoclavicular + R: -  L: + I, TH, L   Elbow flexion + -                                   Palpation:  Pain Report:  - none    + mild    ++ moderate    +++ severe   Tenderness 5/30/17 7/13/17 8/4/2017 9/8/2017   Ulnar elbow R>L      Ulnar forearm (several areas) + +     [place] -      Pec  + + + min   Subscap/teres/lat ++ ++ + -   tricep ++ ++ +      Ulnar Nerve MMT: (Scale 0/5)   5/30/17 7/13/17 9/8/2017    FCU 5/5 5/5 5/5    FDP II, IV 4/5 4/5 5/5    ADM 4/5 4/5 4+/5    ODM 4/5 4/5 4+/5    FDM 5/5 5/5 5/5    Palmar Interossei 4-/5 4-/5 4+/5    Dorsal Interossei 4/5 4/5 4/5    Lumbricals III, IV 5/5 5/5 5/5    Adductor Pollicis 4-/5 4-/5 4/5    FPB 5/5 5/5 5/5          Strength:   (Measured in pounds)  Pain Report:  - none    + mild    ++  moderate    +++ severe     5/30/2017 7/13/2017 9/8/2017   Trials Left Right Right Right   1  2  3       Average: 55# 38# 38# 55#     Lat Pinch  5/30/2017 7/13/2017 9/8/2017   Trials Left Right Right Right   1  2  3       Average: 16# 10# + Fromert's sign 10# + Fromert's sign 11# Less Fromert's sign     3 Pt Pinch  5/30/2017 7/13/2017 9/8/2017   Trials Left Right Right Right   1  2  3       Average: 14# 6# 6# 7#     Edema:  NONE    Sensation:  WNL throughout all nerve distributions; per patient report distribution    Assessment:  Response to therapy has been improvement to:  Strength:   and pinch  Work Performance:  improved  Paresthesias:  Ulnar nerve - smaller area of involvement    Overall Assessment:  Patient's symptoms are resolving.  STG/LTG:  STGoals have been reviewed and progress or achievement has occurred;  see goal sheet for details and updates.    Plan:  Frequency/Duration:  Recommend continuing with the current treatment plan. 1 X a month, once daily  for 3-6 visits  Appropriateness of Rx I have re-evaluated this patient and find that the nature, scope, duration and intensity of the therapy is appropriate for the medical condition of the patient.    Recommendations for Continued Therapy      Home Exercise Program:  Foam roller pecs;lats;teres  Tennis ball to infraspinatus  Can massage to tricep  Scapular squeezes  Lat stretch  pec stretch for median and ulnar  Tennis ball/golf ball on wall in pecs  Pec minor wall stretch  Advanced ulnar nerve glide    Next Visit:  Instruct in specific ulnar nerve innervated muscle strengthening  Nerve gliding  Cont. Foam rolling  strengthening

## 2017-09-08 NOTE — MR AVS SNAPSHOT
After Visit Summary   9/8/2017    Kristin Brooke    MRN: [id number]           Patient Information     Date Of Birth          1955        Visit Information        Provider Department      9/8/2017 8:30 AM Gaby Smith [facility]        Today's Diagnoses     Hand weakness           Follow-ups after your visit        Who to contact     If you have questions or need follow up information about today's clinic visit or your schedule please contact [facility] directly at [phone].  Normal or non-critical lab and imaging results will be communicated to you by Provesicahart, letter or phone within 4 business days after the clinic has received the results. If you do not hear from us within 7 days, please contact the clinic through Provesicahart or phone. If you have a critical or abnormal lab result, we will notify you by phone as soon as possible.  Submit refill requests through KitLocate or call your pharmacy and they will forward the refill request to us. Please allow 3 business days for your refill to be completed.          Additional Information About Your Visit        MyChart Information     KitLocate gives you secure access to your electronic health record. If you see a primary care provider, you can also send messages to your care team and make appointments. If you have questions, please call your primary care clinic.  If you do not have a primary care provider, please call [phone] and they will assist you.        Care EveryWhere ID     This is your Care EveryWhere ID. This could be used by other organizations to access your [place] medical records  [id number]         Blood Pressure from Last 3 Encounters:   09/05/17 126/84   12/30/11 126/78   11/15/11 133/77    Weight from Last 3 Encounters:   09/05/17 53.5 kg (118 lb)   12/30/11 54.4 kg (120 lb)   11/15/11 55.3 kg (122 lb)              We Performed the Following     AUBRE[facility] PROGRESS NOTES REPORT     INFRARED THERAPY     MANUAL THER  TECH,1+REGIONS, 15 MIN     NEUROMUSCULAR RE-EDUCATION        Primary Care Provider Office Phone # Fax #    Merlin Emery Brown, [phone]       [facility] [address] [address]        Equal Access to Services     LUIS DES : Hadii aad ku hadasho Soomaali, waaxda luqadaha, qaybta kaalmada adeegyada, waxay idiin hayaan adevon lee laailinyoly . So [facility] [phone].    ATENCIÓN: Si habla español, tiene a matthews disposición servicios gratuitos de asistencia lingüística. Llame al [phone].    We comply with applicable federal civil rights laws and [place] laws. We do not discriminate on the basis of race, color, national origin, age, disability sex, sexual orientation or gender identity.            Thank you!     Thank you for choosing [facility]  for your care. Our goal is always to provide you with excellent care. Hearing back from our patients is one way we can continue to improve our services. Please take a few minutes to complete the written survey that you may receive in the mail after your visit with us. Thank you!             Your Updated Medication List - Protect others around you: Learn how to safely use, store and throw away your medicines at www.disposemymeds.org.          This list is accurate as of: 9/8/17 10:43 AM.  Always use your most recent med list.                   Brand Name Dispense Instructions for use Diagnosis    ADVIL 200 MG capsule   Generic drug:  ibuprofen      1 CAPSULE EVERY 4 TO 6 HOURS AS NEEDED        estradiol 0.1 MG/GM cream    ESTRACE     Place 2 g vaginally        hydrocortisone 1% cream     30 g    Apply topically once a week As needed for urethritis, give pt applicators    Urethritis       lisinopril 10 MG tablet    PRINIVIL/ZESTRIL    94 tablet    Take 1 tablet by mouth daily.    Essential hypertension, benign       Multiple vitamin Tabs      1 TABLET DAILY        TYLENOL PO      Take by mouth every 4 hours as needed  for mild pain or fever

## 2017-10-16 ENCOUNTER — THERAPY VISIT (OUTPATIENT)
Dept: PHYSICAL THERAPY | Facility: CLINIC | Age: 62
End: 2017-10-16
Payer: COMMERCIAL

## 2017-10-16 DIAGNOSIS — M25.552 HIP PAIN, LEFT: Primary | ICD-10-CM

## 2017-10-16 PROCEDURE — 97161 PT EVAL LOW COMPLEX 20 MIN: CPT | Mod: GP | Performed by: PHYSICAL THERAPIST

## 2017-10-16 PROCEDURE — 97110 THERAPEUTIC EXERCISES: CPT | Mod: GP | Performed by: PHYSICAL THERAPIST

## 2017-10-16 PROCEDURE — 97140 MANUAL THERAPY 1/> REGIONS: CPT | Mod: GP | Performed by: PHYSICAL THERAPIST

## 2017-10-16 ASSESSMENT — ACTIVITIES OF DAILY LIVING (ADL)
LIGHT_TO_MODERATE_WORK: NO DIFFICULTY AT ALL
TWISTING/PIVOTING_ON_INVOLVED_LEG: NO DIFFICULTY AT ALL
HOS_ADL_ITEM_SCORE_TOTAL: 50
WALKING_15_MINUTES_OR_GREATER: SLIGHT DIFFICULTY
WALKING_UP_STEEP_HILLS: SLIGHT DIFFICULTY
HEAVY_WORK: NO DIFFICULTY AT ALL
STANDING_FOR_15_MINUTES: SLIGHT DIFFICULTY
WALKING_INITIALLY: MODERATE DIFFICULTY
HOS_ADL_HIGHEST_POTENTIAL_SCORE: 68
RECREATIONAL_ACTIVITIES: MODERATE DIFFICULTY
WALKING_DOWN_STEEP_HILLS: NO DIFFICULTY AT ALL
GETTING_INTO_AND_OUT_OF_AN_AVERAGE_CAR: EXTREME DIFFICULTY
PUTTING_ON_SOCKS_AND_SHOES: EXTREME DIFFICULTY
HOW_WOULD_YOU_RATE_YOUR_CURRENT_LEVEL_OF_FUNCTION_DURING_YOUR_USUAL_ACTIVITIES_OF_DAILY_LIVING_FROM_0_TO_100_WITH_100_BEING_YOUR_LEVEL_OF_FUNCTION_PRIOR_TO_YOUR_HIP_PROBLEM_AND_0_BEING_THE_INABILITY_TO_PERFORM_ANY_OF_YOUR_USUAL_DAILY_ACTIVITIES?: 30
HOS_ADL_COUNT: 17
GOING_DOWN_1_FLIGHT_OF_STAIRS: SLIGHT DIFFICULTY
SITTING_FOR_15_MINUTES: SLIGHT DIFFICULTY
GETTING_INTO_AND_OUT_OF_A_BATHTUB: SLIGHT DIFFICULTY
ROLLING_OVER_IN_BED: EXTREME DIFFICULTY
DEEP_SQUATTING: NO DIFFICULTY AT ALL
GOING_UP_1_FLIGHT_OF_STAIRS: SLIGHT DIFFICULTY
WALKING_APPROXIMATELY_10_MINUTES: SLIGHT DIFFICULTY
HOS_ADL_SCORE(%): 73.53
STEPPING_UP_AND_DOWN_CURBS: SLIGHT DIFFICULTY

## 2017-10-16 NOTE — PROGRESS NOTES
Subjective:    Patient is a 62 year old female presenting with rehab left hip hpi.                                      Pertinent medical history includes:  High blood pressure and menopausal.  Medical allergies: yes (The antibiotic Leviquin).    Current medications:  High blood pressure medication.  Current occupation is [employer].    Primary job tasks include:  Prolonged standing, driving and repetitive tasks.                                Objective:    System    Physical Exam    General     ROS    Assessment/Plan:

## 2017-10-16 NOTE — LETTER
[facility]  [address]  [facility] [address]  [phone]    2017    Re: Kristin Brooke   :   1955  MRN:  [id number]   REFERRING PHYSICIAN:   Merlin Emery Brown    [facility]  Date of Initial Evaluation:  10/16/2017  Visits: 1 Rxs Used: 1  Reason for Referral:  Hip pain, left    EVALUATION SUMMARY    Subjective:  Physical Therapy Initial Evaluation   10/16//17   Precautions/Restrictions/MD instructions: PT eval and treat   Therapist Impression:   Pt is a 61 y/o female, with 3 week history of left groin/hip pain. Pt presents with pain, decreased ROM/mobility, and decreased strength. These impairments limit their ability to walking, rolling over in bed at night, and getting in to and out of car. Skilled PT services necessary in order to reduce impairments and improve independent function.  Pertinent medical history includes:  High blood pressure and menopausal.  Medical allergies: yes (The antibiotic Leviquin).  Current medications:  High blood pressure medication.  Current occupation is [employer].  Primary job tasks include:  Prolonged standing, driving and repetitive tasks.  Red Flags: none to note  Subjective:   Chief Complaint: Left hip/groin pain. Started exercise at [employer] [facility] [employer] ([employer] treadmill walking and rowing machine).   DOI/onset: 10/1/17 DOS: none  Location: L groin/hip Quality: sharp/shooting; dull/ache other times  Frequency: constant  Radiates: Left mid thigh  Pain scale: Rest 1/10 Activity 9/10   Sleeping: wake with turning over (3X/nights)   Exacerbated by: walking (hills), bending over, getting into and out of car  Relieved by: ibprofen, ice Progression: getting worse  Previous Treatment: none Effect of prior treatment: n/a   PMH and/or surgical history: none   Imaging: none to note   Occupation:   Job duties: standing   Current HEP/exercise regimen:  resting due to pain Patient's goals: walk/hike/sleep and work out without pain  Medications: HTN  General health as reported by patient: excellent  Return to MD: as needed  HPI      Re: Kristin Brooke   :   1955    Objective:  HIP:  SL Balance:   R: 30e sec (L hip drop)  L: 30 sec (R hip drop)  Gait: decreased hip extension on left and left hip drop  Functional:   - squat: narrow stance, knees over toes, and weight shift right     PROM: (* indicates patient's pain)   L  R   Flexion 90* 120   Extension 10 10   Abduction 45 45   IR (supine 90-90) 10** 40   ER (supine 90-90) 45 50     Strength:   L R   HIP     Flex 2+/5 5/5   Ext 4/5 4/5   Add (0-45-90 degrees: supine) 5/5 5/5   Abd 4/5 4/5     Special tests:   L R   MAURICE - -   FADIR + -   SLR Unable to    Hamstring 90-90     Log Roll - -     Palpation: no pain to palpation over L adductors and hip flexor; pain to glute bilaterally  System  Physical Exam  General   ROS    Assessment/Plan:    Patient is a 62 year old female with left side hip complaints.    Patient has the following significant findings with corresponding treatment plan.                Diagnosis 1:  Left hip/groin pain  Pain -  hot/cold therapy, manual therapy, splint/taping/bracing/orthotics, self management, education and home program  Decreased ROM/flexibility - manual therapy and therapeutic exercise  Decreased joint mobility - manual therapy and therapeutic exercise  Decreased strength - therapeutic exercise and therapeutic activities  Inflammation - cold therapy and self management/home program  Impaired gait - gait training  Impaired muscle performance - neuro re-education  Decreased function - therapeutic activities  Impaired posture - neuro re-education    Re: Kristin Brooke   :   1955    Therapy Evaluation Codes:   1) History comprised of:   Personal factors that impact the plan of care:      None.    Comorbidity factors that impact the plan of care are:      High blood  pressure.     Medications impacting care: High blood pressure.  2) Examination of Body Systems comprised of:   Body structures and functions that impact the plan of care:      Hip.   Activity limitations that impact the plan of care are:      Bending, Driving, Sitting, Sleeping and Laying down.  3) Clinical presentation characteristics are:   Stable/Uncomplicated.  4) Decision-Making    Low complexity using standardized patient assessment instrument and/or    measureable assessment of functional outcome.  Cumulative Therapy Evaluation is: Low complexity.  Previous and current functional limitations:  (See Goal Flow Sheet for this information)    Short term and Long term goals: (See Goal Flow Sheet for this information)   Communication ability:  Patient appears to be able to clearly communicate and understand verbal and written communication and follow directions correctly.  Treatment Explanation - The following has been discussed with the patient:  RX ordered/plan of care  Anticipated outcomes  Possible risks and side effects  This patient would benefit from PT intervention to resume normal activities.   Rehab potential is good.    Frequency:  1 X week, once daily  Duration:  for 8 weeks  Discharge Plan:  Achieve all LTG.  Independent in home treatment program.  Reach maximal therapeutic benefit.    Thank you for your referral.    INQUIRIES  Therapist:  Kia Horner DPT [facility]  [facility] [place] [facility]  [address] [place] [address] [address] [address]  Phone: [phone]  Fax: [phone]

## 2017-10-16 NOTE — PROGRESS NOTES
Subjective:  Physical Therapy Initial Evaluation   10/16//17   Precautions/Restrictions/MD instructions: PT eval and treat   Therapist Impression:   Pt is a 63 y/o female, with 3 week history of left groin/hip pain. Pt presents with pain, decreased ROM/mobility, and decreased strength. These impairments limit their ability to walking, rolling over in bed at night, and getting in to and out of car. Skilled PT services necessary in order to reduce impairments and improve independent function.    Subjective:   Chief Complaint: Left hip/groin pain. Started exercise at [facility] ([employer] treadmill walking and rowing machine).   DOI/onset: 10/1/17 DOS: none  Location: L groin/hip Quality: sharp/shooting; dull/ache other times  Frequency: constant  Radiates: Left mid thigh  Pain scale: Rest 1/10 Activity 9/10    Sleeping: wake with turning over (3X/nights)   Exacerbated by: walking (hills), bending over, getting into and out of car  Relieved by: ibprofen, ice Progression: getting worse  Previous Treatment: none Effect of prior treatment: n/a   PMH and/or surgical history: none   Imaging: none to note   Occupation:   Job duties: standing   Current HEP/exercise regimen: resting due to pain Patient's goals: walk/hike/sleep and work out without pain  Medications: HTN  General health as reported by patient: excellent  Return to MD: as needed  Red Flags: none to note    HPI                    Objective:  HIP:    SL Balance:   R: 30e sec (L hip drop)  L: 30 sec (R hip drop)    Gait: decreased hip extension on left and left hip drop    Functional:   - squat: narrow stance, knees over toes, and weight shift right     PROM: (* indicates patient's pain)   L  R   Flexion 90* 120   Extension 10 10   Abduction 45 45   IR (supine 90-90) 10** 40   ER (supine 90-90) 45 50     Strength:   L R   HIP     Flex 2+/5 5/5   Ext 4/5 4/5   Add (0-45-90 degrees: supine) 5/5 5/5   Abd 4/5 4/5     Special tests:   L R   MAURICE - -   ELSA  + -   SLR Unable to    Hamstring 90-90     Log Roll - -       Palpation: no pain to palpation over L adductors and hip flexor; pain to glute bilaterally      System    Physical Exam    General     ROS    Assessment/Plan:      Patient is a 62 year old female with left side hip complaints.    Patient has the following significant findings with corresponding treatment plan.                Diagnosis 1:  Left hip/groin pain  Pain -  hot/cold therapy, manual therapy, splint/taping/bracing/orthotics, self management, education and home program  Decreased ROM/flexibility - manual therapy and therapeutic exercise  Decreased joint mobility - manual therapy and therapeutic exercise  Decreased strength - therapeutic exercise and therapeutic activities  Inflammation - cold therapy and self management/home program  Impaired gait - gait training  Impaired muscle performance - neuro re-education  Decreased function - therapeutic activities  Impaired posture - neuro re-education    Therapy Evaluation Codes:   1) History comprised of:   Personal factors that impact the plan of care:      None.    Comorbidity factors that impact the plan of care are:      High blood pressure.     Medications impacting care: High blood pressure.  2) Examination of Body Systems comprised of:   Body structures and functions that impact the plan of care:      Hip.   Activity limitations that impact the plan of care are:      Bending, Driving, Sitting, Sleeping and Laying down.  3) Clinical presentation characteristics are:   Stable/Uncomplicated.  4) Decision-Making    Low complexity using standardized patient assessment instrument and/or measureable assessment of functional outcome.  Cumulative Therapy Evaluation is: Low complexity.    Previous and current functional limitations:  (See Goal Flow Sheet for this information)    Short term and Long term goals: (See Goal Flow Sheet for this information)     Communication ability:  Patient appears to be able  to clearly communicate and understand verbal and written communication and follow directions correctly.  Treatment Explanation - The following has been discussed with the patient:   RX ordered/plan of care  Anticipated outcomes  Possible risks and side effects  This patient would benefit from PT intervention to resume normal activities.   Rehab potential is good.    Frequency:  1 X week, once daily  Duration:  for 8 weeks  Discharge Plan:  Achieve all LTG.  Independent in home treatment program.  Reach maximal therapeutic benefit.    Please refer to the daily flowsheet for treatment today, total treatment time and time spent performing 1:1 timed codes.

## 2017-10-16 NOTE — MR AVS SNAPSHOT
After Visit Summary   10/16/2017    Kristin Brooke    MRN: [id number]           Patient Information     Date Of Birth          1955        Visit Information        Provider Department      10/16/2017 10:00 AM Kia Horner, PT [facility]        Today's Diagnoses     Hip pain, left    -  1       Follow-ups after your visit        Your next 10 appointments already scheduled     Oct 23, 2017 11:20 AM CDT   AUBREY Extremity with Kia Horner PT   [facility] ([place]  )    [address] [place] [address] [address]  [facility] [address]   [phone]            Oct 30, 2017  1:50 PM CDT   AUBREY Extremity with Kia Horner PT   [facility] ([place]  )    [address] [place], [place] [id number]  [place] [address]   [phone]            Nov 06, 2017 10:00 AM CST   AUBREY Extremity with Kia Horner PT   [facility] (AUBREY [place]  )    [address] [place] [address] [address]  [facility] [address]   [phone]              Who to contact     If you have questions or need follow up information about today's clinic visit or your schedule please contact [facility] directly at [phone].  Normal or non-critical lab and imaging results will be communicated to you by MyChart, letter or phone within 4 business days after the clinic has received the results. If you do not hear from us within 7 days, please contact the clinic through [facility] or phone. If you have a critical or abnormal lab result, we will notify you by phone as soon as possible.  Submit refill requests through [employer] or call your pharmacy and they will forward the refill request to us. Please allow 3 business days for your refill to be completed.          Additional Information About Your Visit        MyChart Information     [email or web address] gives  you secure access to your electronic health record. If you see a primary care provider, you can also send messages to your care team and make appointments. If you have questions, please call your primary care clinic.  If you do not have a primary care provider, please call [phone] and they will assist you.        Care EveryWhere ID     This is your Care EveryWhere ID. This could be used by other organizations to access your [place] medical records  [id number]         Blood Pressure from Last 3 Encounters:   09/05/17 126/84   12/30/11 126/78   11/15/11 133/77    Weight from Last 3 Encounters:   09/05/17 53.5 kg (118 lb)   12/30/11 54.4 kg (120 lb)   11/15/11 55.3 kg (122 lb)              We Performed the Following     HC PT EVAL, LOW COMPLEXITY     AUBRYE INITIAL EVAL REPORT     MANUAL THER TECH,1+REGIONS,EA 15 MIN     THERAPEUTIC EXERCISES        Primary Care Provider Office Phone # Fax #    Merlin Mor Hunter [phone]       [facility] [address] [address]        Equal Access to Services     [facility] : Hadii aad ku hadasho Sovenessa, waaxda luqadaha, qaybta kaalmada jennifer, bharath jacques . [facility] [phone].    ATENCIÓN: Si habla español, tiene a matthews disposición servicios gratuitos de asistencia lingüística. [facility] [phone].    We comply with applicable federal civil rights laws and [place] laws. We do not discriminate on the basis of race, color, national origin, age, disability, sex, sexual orientation, or gender identity.            Thank you!     Thank you for choosing [facility]  for your care. Our goal is always to provide you with excellent care. Hearing back from our patients is one way we can continue to improve our services. Please take a few minutes to complete the written survey that you may receive in the mail after your visit with us. Thank you!              Your Updated Medication List - Protect others around you: Learn how to safely use, store and throw away your medicines at www.disposemymeds.org.          This list is accurate as of: 10/16/17 11:29 AM.  Always use your most recent med list.                   Brand Name Dispense Instructions for use Diagnosis    ADVIL 200 MG capsule   Generic drug:  ibuprofen      1 CAPSULE EVERY 4 TO 6 HOURS AS NEEDED        estradiol 0.1 MG/GM cream    ESTRACE     Place 2 g vaginally        hydrocortisone 1% cream     30 g    Apply topically once a week As needed for urethritis, give pt applicators    Urethritis       lisinopril 10 MG tablet    PRINIVIL/ZESTRIL    94 tablet    Take 1 tablet by mouth daily.    Essential hypertension, benign       Multiple vitamin Tabs      1 TABLET DAILY        TYLENOL PO      Take by mouth every 4 hours as needed for mild pain or fever

## 2017-10-23 ENCOUNTER — HOSPITAL ENCOUNTER (OUTPATIENT)
Dept: MAMMOGRAPHY | Facility: CLINIC | Age: 62
Discharge: HOME | End: 2017-10-23
Attending: INTERNAL MEDICINE | Admitting: INTERNAL MEDICINE
Payer: COMMERCIAL

## 2017-10-23 ENCOUNTER — THERAPY VISIT (OUTPATIENT)
Dept: PHYSICAL THERAPY | Facility: CLINIC | Age: 62
End: 2017-10-23
Payer: COMMERCIAL

## 2017-10-23 DIAGNOSIS — Z12.31 VISIT FOR SCREENING MAMMOGRAM: ICD-10-CM

## 2017-10-23 DIAGNOSIS — M25.552 HIP PAIN, LEFT: ICD-10-CM

## 2017-10-23 PROCEDURE — 97110 THERAPEUTIC EXERCISES: CPT | Mod: GP | Performed by: PHYSICAL THERAPIST

## 2017-10-23 PROCEDURE — 97140 MANUAL THERAPY 1/> REGIONS: CPT | Mod: GP | Performed by: PHYSICAL THERAPIST

## 2017-10-23 PROCEDURE — G0202 SCR MAMMO BI INCL CAD: HCPCS

## 2017-10-31 DIAGNOSIS — I10 ESSENTIAL HYPERTENSION, BENIGN: ICD-10-CM

## 2017-11-01 RX ORDER — LISINOPRIL 10 MG/1
TABLET ORAL
Start: 2017-11-01

## 2017-11-01 NOTE — TELEPHONE ENCOUNTER
Denied  Patient has not been seen at [facility] since 2011  Arlene DOMINGUEZ RN    Requested Prescriptions   Pending Prescriptions Disp Refills     lisinopril (PRINIVIL/ZESTRIL) 10 MG tablet [Pharmacy Med Name: LISINOPRIL 10MG TABLETS] 90 tablet 0     Sig: TAKE 1 TABLET BY MOUTH EVERY DAY    ACE Inhibitors (Including Combos) Protocol Failed    10/31/2017  4:10 PM       Failed - Recent or future visit with authorizing provider's specialty    Patient had office visit in the last year or has a visit in the next 30 days with authorizing provider.  See chart review.              Failed - Normal serum creatinine on file in past 12 months    Recent Labs   Lab Test  06/13/11   1039   CR  0.83            Failed - Normal serum potassium on file in past 12 months    Recent Labs   Lab Test  06/13/11   1039   POTASSIUM  4.1            Passed - Blood pressure under 140/90    BP Readings from Last 3 Encounters:   09/05/17 126/84   12/30/11 126/78   11/15/11 133/77                Passed - Patient is age 18 or older       Passed - No active pregnancy on record       Passed - No positive pregnancy test in past 12 months

## 2017-11-03 ENCOUNTER — THERAPY VISIT (OUTPATIENT)
Dept: PHYSICAL THERAPY | Facility: CLINIC | Age: 62
End: 2017-11-03
Payer: COMMERCIAL

## 2017-11-03 DIAGNOSIS — M25.552 HIP PAIN, LEFT: ICD-10-CM

## 2017-11-03 PROCEDURE — 97140 MANUAL THERAPY 1/> REGIONS: CPT | Mod: GP | Performed by: PHYSICAL THERAPIST

## 2017-11-03 PROCEDURE — 97112 NEUROMUSCULAR REEDUCATION: CPT | Mod: GP | Performed by: PHYSICAL THERAPIST

## 2017-11-03 PROCEDURE — 97110 THERAPEUTIC EXERCISES: CPT | Mod: GP | Performed by: PHYSICAL THERAPIST

## 2017-11-10 ENCOUNTER — THERAPY VISIT (OUTPATIENT)
Dept: PHYSICAL THERAPY | Facility: CLINIC | Age: 62
End: 2017-11-10
Payer: COMMERCIAL

## 2017-11-10 DIAGNOSIS — M25.552 HIP PAIN, LEFT: ICD-10-CM

## 2017-11-10 PROCEDURE — 97140 MANUAL THERAPY 1/> REGIONS: CPT | Mod: GP | Performed by: PHYSICAL THERAPIST

## 2017-11-10 PROCEDURE — 97112 NEUROMUSCULAR REEDUCATION: CPT | Mod: GP | Performed by: PHYSICAL THERAPIST

## 2017-11-10 PROCEDURE — 97110 THERAPEUTIC EXERCISES: CPT | Mod: GP | Performed by: PHYSICAL THERAPIST

## 2017-12-05 ENCOUNTER — THERAPY VISIT (OUTPATIENT)
Dept: PHYSICAL THERAPY | Facility: CLINIC | Age: 62
End: 2017-12-05
Payer: COMMERCIAL

## 2017-12-05 DIAGNOSIS — M25.552 HIP PAIN, LEFT: ICD-10-CM

## 2017-12-05 PROCEDURE — 97110 THERAPEUTIC EXERCISES: CPT | Mod: GP | Performed by: PHYSICAL THERAPIST

## 2018-01-11 ENCOUNTER — THERAPY VISIT (OUTPATIENT)
Dept: PHYSICAL THERAPY | Facility: CLINIC | Age: 63
End: 2018-01-11
Payer: COMMERCIAL

## 2018-01-11 DIAGNOSIS — M25.552 HIP PAIN, LEFT: ICD-10-CM

## 2018-01-11 PROCEDURE — 97112 NEUROMUSCULAR REEDUCATION: CPT | Mod: GP | Performed by: PHYSICAL THERAPIST

## 2018-01-11 PROCEDURE — 97110 THERAPEUTIC EXERCISES: CPT | Mod: GP | Performed by: PHYSICAL THERAPIST

## 2018-01-11 PROCEDURE — 97140 MANUAL THERAPY 1/> REGIONS: CPT | Mod: GP | Performed by: PHYSICAL THERAPIST

## 2018-01-11 ASSESSMENT — ACTIVITIES OF DAILY LIVING (ADL)
WALKING_15_MINUTES_OR_GREATER: NO DIFFICULTY AT ALL
SITTING_FOR_15_MINUTES: NO DIFFICULTY AT ALL
HOS_ADL_COUNT: 17
WALKING_UP_STEEP_HILLS: NO DIFFICULTY AT ALL
ROLLING_OVER_IN_BED: NO DIFFICULTY AT ALL
WALKING_APPROXIMATELY_10_MINUTES: NO DIFFICULTY AT ALL
STANDING_FOR_15_MINUTES: NO DIFFICULTY AT ALL
TWISTING/PIVOTING_ON_INVOLVED_LEG: NO DIFFICULTY AT ALL
GOING_UP_1_FLIGHT_OF_STAIRS: NO DIFFICULTY AT ALL
RECREATIONAL_ACTIVITIES: SLIGHT DIFFICULTY
HOS_ADL_HIGHEST_POTENTIAL_SCORE: 68
HOS_ADL_ITEM_SCORE_TOTAL: 66
WALKING_DOWN_STEEP_HILLS: NO DIFFICULTY AT ALL
STEPPING_UP_AND_DOWN_CURBS: NO DIFFICULTY AT ALL
HOS_ADL_SCORE(%): 97.06
GETTING_INTO_AND_OUT_OF_AN_AVERAGE_CAR: SLIGHT DIFFICULTY
LIGHT_TO_MODERATE_WORK: NO DIFFICULTY AT ALL
WALKING_INITIALLY: NO DIFFICULTY AT ALL
GOING_DOWN_1_FLIGHT_OF_STAIRS: NO DIFFICULTY AT ALL
DEEP_SQUATTING: NO DIFFICULTY AT ALL
HOW_WOULD_YOU_RATE_YOUR_CURRENT_LEVEL_OF_FUNCTION_DURING_YOUR_USUAL_ACTIVITIES_OF_DAILY_LIVING_FROM_0_TO_100_WITH_100_BEING_YOUR_LEVEL_OF_FUNCTION_PRIOR_TO_YOUR_HIP_PROBLEM_AND_0_BEING_THE_INABILITY_TO_PERFORM_ANY_OF_YOUR_USUAL_DAILY_ACTIVITIES?: 80
GETTING_INTO_AND_OUT_OF_A_BATHTUB: NO DIFFICULTY AT ALL
PUTTING_ON_SOCKS_AND_SHOES: NO DIFFICULTY AT ALL
HEAVY_WORK: NO DIFFICULTY AT ALL

## 2019-02-22 DIAGNOSIS — N34.2 URETHRITIS: ICD-10-CM

## 2019-02-22 NOTE — TELEPHONE ENCOUNTER
Denied refill.  Patient was last seen 09/05/2017, was to follow-up yearly.  Patient needs to be seen in the clinic for a refill.  Wisam MORGAN  [facility]  February 22, 2019 4:01 PM

## 2019-07-01 ENCOUNTER — HOSPITAL ENCOUNTER (OUTPATIENT)
Dept: MAMMOGRAPHY | Facility: CLINIC | Age: 64
Discharge: HOME | End: 2019-07-01
Attending: INTERNAL MEDICINE | Admitting: INTERNAL MEDICINE
Payer: COMMERCIAL

## 2019-07-01 DIAGNOSIS — Z12.31 VISIT FOR SCREENING MAMMOGRAM: ICD-10-CM

## 2019-07-01 PROCEDURE — 77063 BREAST TOMOSYNTHESIS BI: CPT

## 2019-10-02 ENCOUNTER — HEALTH MAINTENANCE LETTER (OUTPATIENT)
Age: 64
End: 2019-10-02

## 2020-03-16 ENCOUNTER — VIRTUAL VISIT (OUTPATIENT)
Dept: FAMILY MEDICINE | Facility: OTHER | Age: 65
End: 2020-03-16

## 2020-03-17 NOTE — PROGRESS NOTES
"Date: 2020 15:28:58  Clinician: Jodi Snowden  Clinician NPI: [id number]  Patient: Kristin Brooke  Patient : 1955  Patient Address: [address], [place] [address]  Patient Phone: [phone]  Visit Protocol: URI  Patient Summary:  Kristin is a 64 year old ( : 1955 ) female who initiated a Visit for COVID-19 (Coronavirus) evaluation and screening. When asked the question \"Please sign me up to receive news, health information and promotions. \", Kristin responded \"Yes\".    Kristin states her symptoms started 1-2 days ago.   Her symptoms consist of malaise, a headache, and myalgia.   Symptom details   Headache: She states the headache is mild (1-3 on a 10 point pain scale).    Kristin denies having ear pain, rhinitis, enlarged lymph nodes, facial pain or pressure, wheezing, sore throat, cough, nasal congestion, teeth pain, fever, and chills. She also denies having recent facial or sinus surgery in the past 60 days and taking antibiotic medication for the symptoms. She is not experiencing dyspnea.   Precipitating events  She has not recently been exposed to someone with influenza. Kristin has been in close contact with the following high risk individuals: adults 65 or older and people with asthma, heart disease or diabetes.   Pertinent COVID-19 (Coronavirus) information  Kristin has not traveled internationally or to the areas where COVID-19 (Coronavirus) is widespread in the last 14 days before the start of her symptoms.   Kristin has not had close contact with a suspected or laboratory-confirmed COVID-19 patient within 14 days of symptom onset.   Kristin is not a healthcare worker and does not work in a healthcare facility.   Pertinent medical history  Kristin does not get yeast infections when she takes antibiotics.   Kristin does not need a return to work/school note.   Weight: 117 lbs   Kristin does not smoke or use smokeless tobacco.   Weight: 117 lbs    MEDICATIONS: sertraline oral, trazodone oral, " hydrochlorothiazide oral, lisinopril oral, ALLERGIES: Levaquin  Clinician Response:  Dear Kristin,   Based on the information you have provided without any symptoms of the virus (cough, fever, shortness of breath are the most common symptoms), it does not appear you need Coronavirus (COVID-19) testing at this time.  If you develop cough and fever, submit a new visit to us so we can consider if you would be appropriate for curbside COVID testing.  Thank you for limiting contact with others, wearing a simple mask to cover your cough, practice good hand hygiene habits and accessing our virtual services where possible to limit the spread of this virus.  For more information about COVID19 and options for caring for yourself at home, please visit the CDC website at https://www.cdc.gov/coronavirus/2019-ncov/about/steps-when-sick.html  For more options for care at [facility], please visit our website at [email or web address]                    Diagnosis: Myalgia  Diagnosis ICD: M79.1

## 2020-09-26 ENCOUNTER — VIRTUAL VISIT (OUTPATIENT)
Dept: FAMILY MEDICINE | Facility: OTHER | Age: 65
End: 2020-09-26

## 2020-09-26 DIAGNOSIS — Z20.822 SUSPECTED 2019 NOVEL CORONAVIRUS INFECTION: Primary | ICD-10-CM

## 2020-09-26 NOTE — PROGRESS NOTES
"Date: 2020 12:25:38  Clinician: Erik Rock  Clinician NPI: [id number]  Patient: Kristin Brooke  Patient : 1955  Patient Address: [address], [place] [address]  Patient Phone: [phone]  Visit Protocol: URI  Patient Summary:  Kristin is a 65 year old ( : 1955 ) female who initiated a [facility] Visit for COVID-19 (Coronavirus) evaluation and screening. When asked the question \"Please sign me up to receive news, health information and promotions. \", Kristin responded \"No\".    Kristin states her symptoms started 1-2 days ago.   Her symptoms consist of a cough, nasal congestion, a headache, facial pain or pressure, myalgia, and malaise.   Symptom details     Nasal secretions: The color of her mucus is clear.    Cough: Kristin coughs a few times an hour and her cough is not more bothersome at night. Phlegm comes into her throat when she coughs. She does not believe her cough is caused by post-nasal drip. The color of the phlegm is clear.     Facial pain or pressure: The facial pain or pressure does not feel worse when bending or leaning forward.     Headache: She states the headache is mild (1-3 on a 10 point pain scale).      Kristin denies having ear pain, anosmia, vomiting, rhinitis, nausea, wheezing, enlarged lymph nodes, fever, chills, sore throat, teeth pain, ageusia, and diarrhea. She also denies taking antibiotic medication in the past month and having recent facial or sinus surgery in the past 60 days. She is not experiencing dyspnea.   Precipitating events  She has not recently been exposed to someone with influenza. Kristin has been in close contact with the following high risk individuals: adults 65 or older and people with asthma, heart disease or diabetes.   Pertinent COVID-19 (Coronavirus) information  In the past 14 days, Kristin has not worked in a congregate living setting.   She does not work or volunteer as healthcare worker or a  and does not work or volunteer in a " healthcare facility.   Kristin also has not lived in a congregate living setting in the past 14 days. She does not live with a healthcare worker.   Kristin has not had a close contact with a laboratory-confirmed COVID-19 patient within 14 days of symptom onset.   Since December 2019, Kristin and has not had upper respiratory infection or influenza-like illness. Has not been diagnosed with lab-confirmed COVID-19 test   Pertinent medical history  Kristin does not get yeast infections when she takes antibiotics.   Kristin does not need a return to work/school note.   Weight: 120 lbs   Kristin does not smoke or use smokeless tobacco.   Additional information as reported by the patient (free text): My friends son tested positive yesterday for covid and I have seen her twice this week.   Weight: 120 lbs    MEDICATIONS: lisinopril oral, sertraline oral, hydrochlorothiazide oral, ALLERGIES: Levaquin  Clinician Response:  Dear Kristin,   Your symptoms show that you may have coronavirus (COVID-19). This illness can cause fever, cough and trouble breathing. Many people get a mild case and get better on their own. Some people can get very sick.  What should I do?  We would like to test you for this virus.   1. Please call [phone] to schedule your visit. Explain that you were referred by [facility] to have a COVID-19 test. Be ready to share your [facility] visit ID number.  The following will serve as your written order for this COVID Test, ordered by me, for the indication of suspected COVID [Z20.828]: The test will be ordered in [employer], our electronic health record, after you are scheduled. It will show as ordered and authorized by Arun Hawkins MD.  Order: COVID-19 (Coronavirus) PCR for SYMPTOMATIC testing from [facility].      2. When it's time for your COVID test:  Stay at least 6 feet away from others. (If someone will drive you to your test, stay in the backseat, as far away from the  as you can.)   Cover your mouth and nose with a mask, tissue or  "washcloth.  Go straight to the testing site. Don't make any stops on the way there or back.      3.Starting now: Stay home and away from others (self-isolate) until:   You've had no fever---and no medicine that reduces fever---for one full day (24 hours). And...   Your other symptoms have gotten better. For example, your cough or breathing has improved. And...   At least 10 days have passed since your symptoms started.       During this time, don't leave the house except for testing or medical care.   Stay in your own room, even for meals. Use your own bathroom if you can.   Stay away from others in your home. No hugging, kissing or shaking hands. No visitors.  Don't go to work, school or anywhere else.    Clean \"high touch\" surfaces often (doorknobs, counters, handles, etc.). Use a household cleaning spray or wipes. You'll find a full list of  on the EPA website: www.epa.gov/pesticide-registration/list-n-disinfectants-use-against-sars-cov-2.   Cover your mouth and nose with a mask, tissue or washcloth to avoid spreading germs.  Wash your hands and face often. Use soap and water.  Caregivers in these groups are at risk for severe illness due to COVID-19:  o People 65 years and older  o People who live in a nursing home or long-term care facility  o People with chronic disease (lung, heart, cancer, diabetes, kidney, liver, immunologic)  o People who have a weakened immune system, including those who:   Are in cancer treatment  Take medicine that weakens the immune system, such as corticosteroids  Had a bone marrow or organ transplant  Have an immune deficiency  Have poorly controlled HIV or AIDS  Are obese (body mass index of 40 or higher)  Smoke regularly   o Caregivers should wear gloves while washing dishes, handling laundry and cleaning bedrooms and bathrooms.  o Use caution when washing and drying laundry: Don't shake dirty laundry, and use the warmest water setting that you can.  o For more tips, go to " www.cdc.gov/coronavirus/2019-ncov/downloads/10Things.pdf.    4.Sign up for [facility]. We know it's scary to hear that you might have COVID-19. We want to track your symptoms to make sure you're okay over the next 2 weeks. Please look for an email from [facility]---this is a free, online program that we'll use to keep in touch. To sign up, follow the link in the email. Learn more at [email or web address]  How can I take care of myself?   Get lots of rest. Drink extra fluids (unless a doctor has told you not to).   Take Tylenol (acetaminophen) for fever or pain. If you have liver or kidney problems, ask your family doctor if it's okay to take Tylenol.   Adults can take either:    650 mg (two 325 mg pills) every 4 to 6 hours, or...   1,000 mg (two 500 mg pills) every 8 hours as needed.    Note: Don't take more than 3,000 mg in one day. Acetaminophen is found in many medicines (both prescribed and over-the-counter medicines). Read all labels to be sure you don't take too much.   For children, check the Tylenol bottle for the right dose. The dose is based on the child's age or weight.    If you have other health problems (like cancer, heart failure, an organ transplant or severe kidney disease): Call your specialty clinic if you don't feel better in the next 2 days.       Know when to call 911. Emergency warning signs include:    Trouble breathing or shortness of breath Pain or pressure in the chest that doesn't go away Feeling confused like you haven't felt before, or not being able to wake up Bluish-colored lips or face.  Where can I get more information?    [facility] -- About COVID-19: [email or web address]   CDC -- What to Do If You're Sick: www.cdc.gov/coronavirus/2019-ncov/about/steps-when-sick.html   CDC -- Ending Home Isolation: www.cdc.gov/coronavirus/2019-ncov/hcp/disposition-in-home-patients.html   CDC -- Caring for Someone:  www.cdc.gov/coronavirus/2019-ncov/if-you-are-sick/care-for-someone.html   [facility] -- Interim Guidance for Hospital Discharge to Home: www.health.[facility].[place]/diseases/coronavirus/hcp/hospdischarge.pdf   [facility] clinical trials (COVID-19 research studies): clinicalaffairs.[facility]-clinical-trials    Below are the COVID-19 hotlines at the [place] Department of Health ([facility]). Interpreters are available.    For health questions: Call [phone] or 1-670.656.7731 (7 a.m. to 7 p.m.) For questions about schools and childcare: Call 417-554-0525 or 1-352.172.4030 (7 a.m. to 7 p.m.)    Diagnosis: Acute upper respiratory infection, unspecified  Diagnosis ICD: J06.9  Additional Clinician Notes:   If your symptoms are not improving, or your symptoms are worsening, please come to one of our urgent care locations for further evaluation and possible strep testing.

## 2021-01-15 ENCOUNTER — HEALTH MAINTENANCE LETTER (OUTPATIENT)
Age: 66
End: 2021-01-15

## 2021-06-21 ENCOUNTER — HOSPITAL ENCOUNTER (OUTPATIENT)
Dept: MAMMOGRAPHY | Facility: CLINIC | Age: 66
Discharge: HOME | End: 2021-06-21
Attending: INTERNAL MEDICINE | Admitting: INTERNAL MEDICINE
Payer: COMMERCIAL

## 2021-06-21 DIAGNOSIS — Z12.31 VISIT FOR SCREENING MAMMOGRAM: ICD-10-CM

## 2021-06-21 PROCEDURE — 77063 BREAST TOMOSYNTHESIS BI: CPT

## 2021-06-28 ENCOUNTER — HOSPITAL ENCOUNTER (OUTPATIENT)
Dept: MAMMOGRAPHY | Facility: CLINIC | Age: 66
End: 2021-06-28
Attending: INTERNAL MEDICINE
Payer: COMMERCIAL

## 2021-06-28 DIAGNOSIS — R92.8 ABNORMAL MAMMOGRAM: ICD-10-CM

## 2021-06-28 PROCEDURE — 76642 ULTRASOUND BREAST LIMITED: CPT | Mod: LT

## 2021-09-04 ENCOUNTER — HEALTH MAINTENANCE LETTER (OUTPATIENT)
Age: 66
End: 2021-09-04

## 2022-10-22 ENCOUNTER — HEALTH MAINTENANCE LETTER (OUTPATIENT)
Age: 67
End: 2022-10-22

## 2022-11-23 ENCOUNTER — ANCILLARY PROCEDURE (OUTPATIENT)
Dept: MAMMOGRAPHY | Facility: CLINIC | Age: 67
End: 2022-11-23
Attending: INTERNAL MEDICINE
Payer: MEDICARE

## 2022-11-23 DIAGNOSIS — Z12.31 VISIT FOR SCREENING MAMMOGRAM: ICD-10-CM

## 2022-11-23 PROCEDURE — 77067 SCR MAMMO BI INCL CAD: CPT

## 2023-11-05 ENCOUNTER — HEALTH MAINTENANCE LETTER (OUTPATIENT)
Age: 68
End: 2023-11-05

## 2024-02-19 ENCOUNTER — HOSPITAL ENCOUNTER (OUTPATIENT)
Dept: MAMMOGRAPHY | Facility: CLINIC | Age: 69
Discharge: HOME | End: 2024-02-19
Attending: INTERNAL MEDICINE | Admitting: INTERNAL MEDICINE
Payer: MEDICARE

## 2024-02-19 DIAGNOSIS — Z12.31 VISIT FOR SCREENING MAMMOGRAM: ICD-10-CM

## 2024-02-19 PROCEDURE — 77063 BREAST TOMOSYNTHESIS BI: CPT

## 2024-12-22 ENCOUNTER — HEALTH MAINTENANCE LETTER (OUTPATIENT)
Age: 69
End: 2024-12-22